# Patient Record
Sex: FEMALE | Race: WHITE | NOT HISPANIC OR LATINO | Employment: OTHER | URBAN - METROPOLITAN AREA
[De-identification: names, ages, dates, MRNs, and addresses within clinical notes are randomized per-mention and may not be internally consistent; named-entity substitution may affect disease eponyms.]

---

## 2021-03-17 ENCOUNTER — TRANSCRIBE ORDERS (OUTPATIENT)
Dept: PULMONOLOGY | Facility: HOSPITAL | Age: 66
End: 2021-03-17

## 2021-03-17 DIAGNOSIS — R93.89 ABNORMAL CHEST X-RAY: Primary | ICD-10-CM

## 2021-03-23 ENCOUNTER — HOSPITAL ENCOUNTER (OUTPATIENT)
Dept: CT IMAGING | Facility: HOSPITAL | Age: 66
Discharge: HOME | End: 2021-03-23
Admitting: INTERNAL MEDICINE

## 2021-03-23 DIAGNOSIS — R93.89 ABNORMAL CHEST X-RAY: ICD-10-CM

## 2021-03-23 PROCEDURE — 71250 CT THORAX DX C-: CPT

## 2022-01-28 ENCOUNTER — OFFICE VISIT (OUTPATIENT)
Dept: PSYCHIATRY | Facility: CLINIC | Age: 67
End: 2022-01-28

## 2022-01-28 VITALS — DIASTOLIC BLOOD PRESSURE: 89 MMHG | SYSTOLIC BLOOD PRESSURE: 162 MMHG | WEIGHT: 236.6 LBS | BODY MASS INDEX: 38.19 KG/M2

## 2022-01-28 DIAGNOSIS — F41.1 GAD (GENERALIZED ANXIETY DISORDER): Chronic | ICD-10-CM

## 2022-01-28 DIAGNOSIS — F51.05 INSOMNIA DUE TO OTHER MENTAL DISORDER: Chronic | ICD-10-CM

## 2022-01-28 DIAGNOSIS — F99 INSOMNIA DUE TO OTHER MENTAL DISORDER: Chronic | ICD-10-CM

## 2022-01-28 DIAGNOSIS — F33.2 SEVERE EPISODE OF RECURRENT MAJOR DEPRESSIVE DISORDER, WITHOUT PSYCHOTIC FEATURES: Primary | Chronic | ICD-10-CM

## 2022-01-28 PROCEDURE — 90792 PSYCH DIAG EVAL W/MED SRVCS: CPT

## 2022-01-28 RX ORDER — AZELASTINE HYDROCHLORIDE, FLUTICASONE PROPIONATE 137; 50 UG/1; UG/1
SPRAY, METERED NASAL
COMMUNITY
Start: 2022-01-27

## 2022-01-28 RX ORDER — FUROSEMIDE 20 MG/1
20 TABLET ORAL DAILY
COMMUNITY
Start: 2021-11-02

## 2022-01-28 RX ORDER — ROSUVASTATIN CALCIUM 10 MG/1
10 TABLET, COATED ORAL DAILY
COMMUNITY
Start: 2021-11-16

## 2022-01-28 RX ORDER — ALBUTEROL SULFATE 90 UG/1
AEROSOL, METERED RESPIRATORY (INHALATION)
COMMUNITY
Start: 2015-01-16

## 2022-01-28 RX ORDER — GUAIFENESIN 600 MG/1
TABLET, EXTENDED RELEASE ORAL
COMMUNITY
Start: 2022-01-28

## 2022-01-28 RX ORDER — VILAZODONE HYDROCHLORIDE 20 MG/1
20 TABLET ORAL DAILY
COMMUNITY
Start: 2022-01-14 | End: 2022-03-28

## 2022-01-28 RX ORDER — CONJUGATED ESTROGENS 0.62 MG/G
CREAM VAGINAL
COMMUNITY
Start: 2021-08-09 | End: 2022-03-28

## 2022-01-28 RX ORDER — CETIRIZINE HYDROCHLORIDE 10 MG/1
10 TABLET ORAL DAILY
COMMUNITY

## 2022-01-28 RX ORDER — LEVOTHYROXINE SODIUM 0.07 MG/1
75 TABLET ORAL DAILY
COMMUNITY
Start: 2021-12-31 | End: 2023-02-14

## 2022-01-28 RX ORDER — LEVOTHYROXINE SODIUM 0.05 MG/1
50 TABLET ORAL DAILY
COMMUNITY
Start: 2021-11-16 | End: 2022-03-28 | Stop reason: DRUGHIGH

## 2022-01-28 RX ORDER — TIZANIDINE 4 MG/1
4 TABLET ORAL
COMMUNITY
Start: 2022-01-14 | End: 2022-04-26

## 2022-01-28 RX ORDER — BUSPIRONE HYDROCHLORIDE 10 MG/1
10 TABLET ORAL 2 TIMES DAILY
Qty: 60 TABLET | Refills: 2 | Status: SHIPPED | OUTPATIENT
Start: 2022-01-28 | End: 2022-03-28 | Stop reason: SDUPTHER

## 2022-01-28 RX ORDER — MONTELUKAST SODIUM 10 MG/1
TABLET ORAL
COMMUNITY
Start: 2013-12-23

## 2022-01-28 RX ORDER — OLMESARTAN MEDOXOMIL 20 MG/1
20 TABLET ORAL DAILY
COMMUNITY
Start: 2021-12-23

## 2022-01-28 RX ORDER — MULTIPLE VITAMINS W/ MINERALS TAB 9MG-400MCG
TAB ORAL
COMMUNITY
Start: 2022-01-28 | End: 2022-03-28

## 2022-01-28 RX ORDER — TRAZODONE HYDROCHLORIDE 50 MG/1
TABLET ORAL
Qty: 30 TABLET | Refills: 0 | Status: SHIPPED | OUTPATIENT
Start: 2022-01-28 | End: 2022-02-25

## 2022-02-25 DIAGNOSIS — F51.05 INSOMNIA DUE TO OTHER MENTAL DISORDER: Chronic | ICD-10-CM

## 2022-02-25 DIAGNOSIS — F99 INSOMNIA DUE TO OTHER MENTAL DISORDER: Chronic | ICD-10-CM

## 2022-02-25 RX ORDER — TRAZODONE HYDROCHLORIDE 50 MG/1
TABLET ORAL
Qty: 30 TABLET | Refills: 0 | Status: SHIPPED | OUTPATIENT
Start: 2022-02-25 | End: 2022-03-24

## 2022-03-24 DIAGNOSIS — F99 INSOMNIA DUE TO OTHER MENTAL DISORDER: Chronic | ICD-10-CM

## 2022-03-24 DIAGNOSIS — F51.05 INSOMNIA DUE TO OTHER MENTAL DISORDER: Chronic | ICD-10-CM

## 2022-03-24 RX ORDER — TRAZODONE HYDROCHLORIDE 50 MG/1
TABLET ORAL
Qty: 30 TABLET | Refills: 0 | Status: SHIPPED | OUTPATIENT
Start: 2022-03-24 | End: 2022-03-28 | Stop reason: DRUGHIGH

## 2022-03-28 ENCOUNTER — OFFICE VISIT (OUTPATIENT)
Dept: PSYCHIATRY | Facility: CLINIC | Age: 67
End: 2022-03-28

## 2022-03-28 DIAGNOSIS — F41.1 GAD (GENERALIZED ANXIETY DISORDER): Primary | Chronic | ICD-10-CM

## 2022-03-28 DIAGNOSIS — F33.42 RECURRENT MAJOR DEPRESSIVE DISORDER, IN FULL REMISSION: Chronic | ICD-10-CM

## 2022-03-28 DIAGNOSIS — F99 INSOMNIA DUE TO OTHER MENTAL DISORDER: Chronic | ICD-10-CM

## 2022-03-28 DIAGNOSIS — F51.05 INSOMNIA DUE TO OTHER MENTAL DISORDER: Chronic | ICD-10-CM

## 2022-03-28 PROCEDURE — 99214 OFFICE O/P EST MOD 30 MIN: CPT

## 2022-03-28 RX ORDER — VILAZODONE HYDROCHLORIDE 40 MG/1
40 TABLET ORAL DAILY
Qty: 30 TABLET | Refills: 2 | Status: SHIPPED | OUTPATIENT
Start: 2022-03-28 | End: 2022-06-02 | Stop reason: SDUPTHER

## 2022-03-28 RX ORDER — TRAZODONE HYDROCHLORIDE 100 MG/1
100 TABLET ORAL NIGHTLY
Qty: 30 TABLET | Refills: 2 | Status: SHIPPED | OUTPATIENT
Start: 2022-03-28 | End: 2022-06-02 | Stop reason: SDUPTHER

## 2022-03-28 RX ORDER — BUSPIRONE HYDROCHLORIDE 10 MG/1
10 TABLET ORAL 2 TIMES DAILY
Qty: 60 TABLET | Refills: 2 | Status: SHIPPED | OUTPATIENT
Start: 2022-03-28 | End: 2022-06-02

## 2022-04-21 DIAGNOSIS — F51.05 INSOMNIA DUE TO OTHER MENTAL DISORDER: Chronic | ICD-10-CM

## 2022-04-21 DIAGNOSIS — F99 INSOMNIA DUE TO OTHER MENTAL DISORDER: Chronic | ICD-10-CM

## 2022-04-21 RX ORDER — TRAZODONE HYDROCHLORIDE 50 MG/1
TABLET ORAL
Qty: 30 TABLET | Refills: 0 | OUTPATIENT
Start: 2022-04-21

## 2022-06-02 ENCOUNTER — OFFICE VISIT (OUTPATIENT)
Dept: PSYCHIATRY | Facility: CLINIC | Age: 67
End: 2022-06-02

## 2022-06-02 VITALS
BODY MASS INDEX: 36.06 KG/M2 | SYSTOLIC BLOOD PRESSURE: 152 MMHG | DIASTOLIC BLOOD PRESSURE: 82 MMHG | OXYGEN SATURATION: 96 % | HEART RATE: 69 BPM | WEIGHT: 223.4 LBS

## 2022-06-02 DIAGNOSIS — F51.05 INSOMNIA DUE TO OTHER MENTAL DISORDER: Chronic | ICD-10-CM

## 2022-06-02 DIAGNOSIS — F33.42 RECURRENT MAJOR DEPRESSIVE DISORDER, IN FULL REMISSION: Chronic | ICD-10-CM

## 2022-06-02 DIAGNOSIS — F99 INSOMNIA DUE TO OTHER MENTAL DISORDER: Chronic | ICD-10-CM

## 2022-06-02 DIAGNOSIS — F41.1 GAD (GENERALIZED ANXIETY DISORDER): Primary | Chronic | ICD-10-CM

## 2022-06-02 PROCEDURE — 99214 OFFICE O/P EST MOD 30 MIN: CPT

## 2022-06-02 RX ORDER — TRAZODONE HYDROCHLORIDE 100 MG/1
100 TABLET ORAL NIGHTLY
Qty: 90 TABLET | Refills: 1 | Status: SHIPPED | OUTPATIENT
Start: 2022-06-02 | End: 2023-01-20 | Stop reason: SDUPTHER

## 2022-06-02 RX ORDER — VILAZODONE HYDROCHLORIDE 40 MG/1
40 TABLET ORAL DAILY
Qty: 90 TABLET | Refills: 1 | Status: SHIPPED | OUTPATIENT
Start: 2022-06-02 | End: 2022-12-05 | Stop reason: SDUPTHER

## 2022-06-02 RX ORDER — BUSPIRONE HYDROCHLORIDE 15 MG/1
15 TABLET ORAL 2 TIMES DAILY
Qty: 60 TABLET | Refills: 2 | Status: SHIPPED | OUTPATIENT
Start: 2022-06-02 | End: 2022-09-09

## 2022-06-03 ENCOUNTER — TELEPHONE (OUTPATIENT)
Dept: PSYCHIATRY | Facility: CLINIC | Age: 67
End: 2022-06-03

## 2022-06-03 NOTE — TELEPHONE ENCOUNTER
Patient called asking if there is any way that a letter can be written for her about her support animal. He is certified, a king stefany marcum. She going through a divorce and the  is trying to take the dog away from her.   Her  stated she needed a letter from her doctor to be able to keep the dog, that has been hers from the beginning.

## 2022-07-23 DIAGNOSIS — F51.05 INSOMNIA DUE TO OTHER MENTAL DISORDER: Chronic | ICD-10-CM

## 2022-07-23 DIAGNOSIS — F99 INSOMNIA DUE TO OTHER MENTAL DISORDER: Chronic | ICD-10-CM

## 2022-07-27 RX ORDER — TRAZODONE HYDROCHLORIDE 100 MG/1
100 TABLET ORAL NIGHTLY
Qty: 30 TABLET | OUTPATIENT
Start: 2022-07-27

## 2022-09-08 DIAGNOSIS — F41.1 GAD (GENERALIZED ANXIETY DISORDER): Chronic | ICD-10-CM

## 2022-09-09 RX ORDER — BUSPIRONE HYDROCHLORIDE 15 MG/1
TABLET ORAL
Qty: 180 TABLET | Refills: 1 | Status: SHIPPED | OUTPATIENT
Start: 2022-09-09 | End: 2023-02-14 | Stop reason: SDUPTHER

## 2022-12-05 DIAGNOSIS — F33.42 RECURRENT MAJOR DEPRESSIVE DISORDER, IN FULL REMISSION: Chronic | ICD-10-CM

## 2022-12-05 DIAGNOSIS — F41.1 GAD (GENERALIZED ANXIETY DISORDER): Chronic | ICD-10-CM

## 2022-12-05 NOTE — TELEPHONE ENCOUNTER
Rx Refill Note  Requested Prescriptions     Pending Prescriptions Disp Refills   • vilazodone (Viibryd) 40 MG tablet tablet 90 tablet 1     Sig: Take 1 tablet by mouth Daily.      Last office visit with prescribing clinician: Visit date not found     Next office visit with prescribing clinician: 11/3/22 gladys (chandrakant)    Office Visit with Chandrakant Colin PA-C (06/02/2022)    Maria C Jacobs  12/05/22, 15:10 EST     She is next in line to make an appt

## 2022-12-06 RX ORDER — VILAZODONE HYDROCHLORIDE 40 MG/1
40 TABLET ORAL DAILY
Qty: 90 TABLET | Refills: 0 | Status: SHIPPED | OUTPATIENT
Start: 2022-12-06 | End: 2023-02-14 | Stop reason: SDUPTHER

## 2023-01-20 DIAGNOSIS — F99 INSOMNIA DUE TO OTHER MENTAL DISORDER: Chronic | ICD-10-CM

## 2023-01-20 DIAGNOSIS — F51.05 INSOMNIA DUE TO OTHER MENTAL DISORDER: Chronic | ICD-10-CM

## 2023-01-20 RX ORDER — TRAZODONE HYDROCHLORIDE 100 MG/1
100 TABLET ORAL NIGHTLY
Qty: 90 TABLET | Refills: 0 | Status: SHIPPED | OUTPATIENT
Start: 2023-01-20 | End: 2023-02-14 | Stop reason: SDUPTHER

## 2023-01-20 NOTE — TELEPHONE ENCOUNTER
Rx Refill Note  Requested Prescriptions      No prescriptions requested or ordered in this encounter      Last office visit with prescribing clinician: Visit date not found   Next office visit with prescribing clinician: 2/14/2023     Progress Notes by Bull Colin PA-C (06/02/2022 15:00)    {  Mary Jo Tovar MA  01/20/23, 10:27 EST

## 2023-02-14 ENCOUNTER — OFFICE VISIT (OUTPATIENT)
Dept: PSYCHIATRY | Facility: CLINIC | Age: 68
End: 2023-02-14
Payer: MEDICARE

## 2023-02-14 VITALS
WEIGHT: 216.2 LBS | BODY MASS INDEX: 34.9 KG/M2 | HEART RATE: 73 BPM | DIASTOLIC BLOOD PRESSURE: 72 MMHG | SYSTOLIC BLOOD PRESSURE: 138 MMHG | OXYGEN SATURATION: 98 %

## 2023-02-14 DIAGNOSIS — F41.1 GAD (GENERALIZED ANXIETY DISORDER): Chronic | ICD-10-CM

## 2023-02-14 DIAGNOSIS — F33.1 MAJOR DEPRESSIVE DISORDER, RECURRENT EPISODE, MODERATE: Primary | Chronic | ICD-10-CM

## 2023-02-14 DIAGNOSIS — F51.05 INSOMNIA DUE TO OTHER MENTAL DISORDER: Chronic | ICD-10-CM

## 2023-02-14 DIAGNOSIS — F99 INSOMNIA DUE TO OTHER MENTAL DISORDER: Chronic | ICD-10-CM

## 2023-02-14 PROCEDURE — 99214 OFFICE O/P EST MOD 30 MIN: CPT

## 2023-02-14 RX ORDER — ALBUTEROL SULFATE 2.5 MG/3ML
SOLUTION RESPIRATORY (INHALATION)
COMMUNITY
Start: 2022-12-12

## 2023-02-14 RX ORDER — MONTELUKAST SODIUM 10 MG/1
1 TABLET ORAL NIGHTLY
COMMUNITY
Start: 2022-12-05

## 2023-02-14 RX ORDER — LEVOTHYROXINE SODIUM 0.07 MG/1
75 TABLET ORAL DAILY
COMMUNITY
Start: 2022-10-17 | End: 2023-11-29

## 2023-02-14 RX ORDER — ALBUTEROL SULFATE 2.5 MG/3ML
2.5 SOLUTION RESPIRATORY (INHALATION)
COMMUNITY
Start: 2022-12-11

## 2023-02-14 RX ORDER — BUSPIRONE HYDROCHLORIDE 15 MG/1
15 TABLET ORAL
COMMUNITY
Start: 2022-10-17

## 2023-02-14 RX ORDER — TRAZODONE HYDROCHLORIDE 100 MG/1
100 TABLET ORAL NIGHTLY
Qty: 90 TABLET | Refills: 1 | Status: SHIPPED | OUTPATIENT
Start: 2023-02-14

## 2023-02-14 RX ORDER — BUSPIRONE HYDROCHLORIDE 15 MG/1
15 TABLET ORAL 2 TIMES DAILY
Qty: 180 TABLET | Refills: 1 | Status: SHIPPED | OUTPATIENT
Start: 2023-02-14

## 2023-02-14 RX ORDER — VILAZODONE HYDROCHLORIDE 40 MG/1
40 TABLET ORAL DAILY
Qty: 90 TABLET | Refills: 1 | Status: SHIPPED | OUTPATIENT
Start: 2023-02-14

## 2023-02-14 RX ORDER — ALBUTEROL SULFATE 90 UG/1
AEROSOL, METERED RESPIRATORY (INHALATION)
COMMUNITY
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
## 2023-05-04 DIAGNOSIS — F41.1 GAD (GENERALIZED ANXIETY DISORDER): Chronic | ICD-10-CM

## 2023-05-04 DIAGNOSIS — F33.1 MAJOR DEPRESSIVE DISORDER, RECURRENT EPISODE, MODERATE: Chronic | ICD-10-CM

## 2023-05-04 RX ORDER — VILAZODONE HYDROCHLORIDE 40 MG/1
40 TABLET ORAL DAILY
Qty: 90 TABLET | Refills: 1 | Status: SHIPPED | OUTPATIENT
Start: 2023-05-04

## 2023-05-15 NOTE — PROGRESS NOTES
Subjective   Carlotta Cooper is a 68 y.o. female who presents today for follow-up for psychiatric medication management.     Chief Complaint:  Follow up for depression, anxiety, and insomnia.     History of Present Illness:     Medication adjustments last visit:  Continue vilazodone 40mg daily.   Continue trazodone 100mg daily.   Continue buspirone 15mg twice daily.      She feels like things are going well, all things considered   They have pushed off court to September now.   His daughter has cut her off and she is not able to see her grandkids (twins).   She feels like depression is ok. No SI/HI/AVH.  She is having some difficulty with sleep, but a lot of it is pain related. We will increase her trazodone to 150mg. Leave other medications the same for now.   Will plan to follow up in 4 months unless needed sooner.     Ability and capacity to respond to treatment: good  Functional status: fair  Prognosis: good  Long term goals: improve depression and overall quality of life.   Short term goals:patient would like to be able to function to get through her divorce when she thinks things will be better.   Strengths: She is compliant with medications.   Weaknesses: She is going through a divorce after a long marriage which makes things very difficult for her.     Patient presents with symptoms and behaviors that are consistent with the following DSM-5 diagnoses:  1. Major depressive disorder  2. Generalized anxiety disorder  3. Insomnia    The following portions of the patient's history were reviewed and updated as appropriate: allergies, current medications, past family history, past medical history, past social history, past surgical history and problem list.    PAST OUTPATIENT TREATMENT  Diagnosis treated:  Affective Disorder, Anxiety/Panic Disorder  Treatment Type:  Medication Management  Prior Psychiatric Medications:  Pristiq - inefficacy after months of use.  Viibryd-effective at 40 mg.  Trazodone-improved  insomnia.  Buspirone-improved anxiety.  Support Groups:  None  Sequelae Of Mental Disorder:  job disruption, family disruption, emotional distress    Interval History  Improved    Side Effects  None      Past Medical History:  Past Medical History:   Diagnosis Date   • Anxiety All my life   • Chronic pain disorder    • Depression Menopause - maybe 15 years ago   • Disease of thyroid gland    • Liver disease Fatty liver disease 2019   • Peripheral neuropathy 2017    Hurt back   • Severe episode of recurrent major depressive disorder, without psychotic features 2022       Social History:  Social History     Socioeconomic History   • Marital status:    Tobacco Use   • Smoking status: Former     Packs/day: 0.25     Years: 10.00     Pack years: 2.50     Types: Cigarettes     Start date: 9/10/1973     Quit date: 3/17/1983     Years since quittin.1   • Smokeless tobacco: Never   • Tobacco comments:     Tore me up   Vaping Use   • Vaping Use: Never used   Substance and Sexual Activity   • Alcohol use: Not Currently   • Drug use: Never   • Sexual activity: Not Currently     Partners: Male     Birth control/protection: None     Comment: Old age       Family History:  Family History   Problem Relation Age of Onset   • Anxiety disorder Mother    • Dementia Mother    • Depression Mother    • Anxiety disorder Sister        Past Surgical History:  Past Surgical History:   Procedure Laterality Date   • ABDOMINAL SURGERY      Gastric sleeve and hiatal hernia repair   • BACK SURGERY  2018   • CARDIAC CATHETERIZATION     • COLONOSCOPY  2 times   • ENDOSCOPY  Past? ,    • EYE SURGERY  Lens for vision and cataracts   • HERNIA REPAIR         Problem List:  Patient Active Problem List   Diagnosis   • Recurrent major depressive disorder, in full remission   • SYLVESTER (generalized anxiety disorder)   • Insomnia due to other mental disorder       Allergy:   Allergies   Allergen Reactions   •  Phentermine Other (See Comments)     tinglilng  tinglilng     • Vancomycin Rash, Hives and Itching     Itching not long after started.  Itching not long after started.     • Phentermine Hcl Unknown - Low Severity        Discontinued Medications:  Medications Discontinued During This Encounter   Medication Reason   • CRANBERRY ULTRA STRENGTH PO *Therapy completed   • guaiFENesin (Mucinex) 600 MG 12 hr tablet *Therapy completed   • D-Mannose (SV D-Mannose) 500 MG capsule *Therapy completed   • albuterol (PROVENTIL) (2.5 MG/3ML) 0.083% nebulizer solution *Error   • albuterol sulfate  (90 Base) MCG/ACT inhaler *Error   • busPIRone (BUSPAR) 15 MG tablet *Error   • Cetirizine HCl 10 MG capsule *Error   • montelukast (SINGULAIR) 10 MG tablet *Error   • traZODone (DESYREL) 100 MG tablet Reorder   • busPIRone (BUSPAR) 15 MG tablet Reorder   • vilazodone (VIIBRYD) 40 MG tablet tablet Reorder       Current Medications:   Current Outpatient Medications   Medication Sig Dispense Refill   • albuterol (PROVENTIL) (2.5 MG/3ML) 0.083% nebulizer solution USE 1 VIAL VIA NEBULIZER EVERY 4 HOURS AS NEEDED FOR WHEEZING OR SHORTNESS OF AIR     • albuterol sulfate  (90 Base) MCG/ACT inhaler Ventolin  (90 Base) MCG/ACT Inhalation Aerosol Solution; Patient Sig: Ventolin  (90 Base) MCG/ACT Inhalation Aerosol Solution ; 0; 16-Jan-2015; Active     • busPIRone (BUSPAR) 15 MG tablet Take 1 tablet by mouth 2 (Two) Times a Day. 180 tablet 1   • cetirizine (zyrTEC) 10 MG tablet Take 1 tablet by mouth Daily.     • furosemide (LASIX) 20 MG tablet Take 1 tablet by mouth Daily.     • levothyroxine (SYNTHROID, LEVOTHROID) 75 MCG tablet Take 1 tablet by mouth Daily.     • montelukast (SINGULAIR) 10 MG tablet Take 1 tablet by mouth Every Night.     • olmesartan (BENICAR) 20 MG tablet Take 1 tablet by mouth Daily.     • rosuvastatin (CRESTOR) 10 MG tablet Take 1 tablet by mouth Daily.     • traZODone (DESYREL) 150 MG tablet Take  1 tablet by mouth Every Night. 90 tablet 1   • Trelegy Ellipta 100-62.5-25 MCG/INH inhaler Inhale 1 puff Daily.     • vilazodone (VIIBRYD) 40 MG tablet tablet Take 1 tablet by mouth Daily. 90 tablet 1   • Azelastine-Fluticasone 137-50 MCG/ACT suspension  (Patient not taking: Reported on 5/16/2023)       No current facility-administered medications for this visit.         Psychological ROS: positive for - anxiety, depression and sleep disturbances  negative for - behavioral disorder, concentration difficulties, decreased libido, disorientation, hallucinations, hostility, irritability, memory difficulties, mood swings, obsessive thoughts, physical abuse, sexual abuse or suicidal ideation      Physical Exam:   Blood pressure 104/78, pulse 63, SpO2 99 %.    Mental Status Exam:   Hygiene:   good  Cooperation:  Cooperative  Eye Contact:  Good  Psychomotor Behavior:  Appropriate  Affect:  Appropriate  Mood: normal  Hopelessness: Denies  Speech:  Normal  Thought Process:  Goal directed and Linear  Thought Content:  Normal  Suicidal:  None  Homicidal:  None  Hallucinations:  None  Delusion:  None  Memory:  Intact  Orientation:  Person, Place, Time and Situation  Reliability:  good  Insight:  Good  Judgement:  Good  Impulse Control:  Good  Physical/Medical Issues:  Yes chronic pain     Mental status exam was reviewed and compared to visit on 2/14/23 and appropriate updates were made.     PHQ-9 Depression Screening    Little interest or pleasure in doing things? 0-->not at all   Feeling down, depressed, or hopeless? 1-->several days   Trouble falling or staying asleep, or sleeping too much? 1-->several days   Feeling tired or having little energy? 1-->several days   Poor appetite or overeating? 1-->several days   Feeling bad about yourself - or that you are a failure or have let yourself or your family down? 2-->more than half the days   Trouble concentrating on things, such as reading the newspaper or watching television?  2-->more than half the days   Moving or speaking so slowly that other people could have noticed? Or the opposite - being so fidgety or restless that you have been moving around a lot more than usual? 0-->not at all   Thoughts that you would be better off dead, or of hurting yourself in some way? 0-->not at all   PHQ-9 Total Score 8   If you checked off any problems, how difficult have these problems made it for you to do your work, take care of things at home, or get along with other people? somewhat difficult        Never smoker    I advised Carlotta of the risks of tobacco use.     Result Review:    Labs:  No visits with results within 3 Month(s) from this visit.   Latest known visit with results is:   No results found for any previous visit.       Assessment & Plan   Diagnoses and all orders for this visit:    1. Insomnia due to other mental disorder  -     traZODone (DESYREL) 150 MG tablet; Take 1 tablet by mouth Every Night.  Dispense: 90 tablet; Refill: 1    2. Major depressive disorder, recurrent episode, moderate  -     vilazodone (VIIBRYD) 40 MG tablet tablet; Take 1 tablet by mouth Daily.  Dispense: 90 tablet; Refill: 1    3. SYLVESTER (generalized anxiety disorder)  -     vilazodone (VIIBRYD) 40 MG tablet tablet; Take 1 tablet by mouth Daily.  Dispense: 90 tablet; Refill: 1  -     busPIRone (BUSPAR) 15 MG tablet; Take 1 tablet by mouth 2 (Two) Times a Day.  Dispense: 180 tablet; Refill: 1         Continue vilazodone 40mg daily.   Continue trazodone, increase to 150mg daily.   Continue buspirone 15mg twice daily.      Visit Diagnoses:    ICD-10-CM ICD-9-CM   1. Insomnia due to other mental disorder  F51.05 300.9    F99 327.02   2. Major depressive disorder, recurrent episode, moderate  F33.1 296.32   3. SYLVESTER (generalized anxiety disorder)  F41.1 300.02       TREATMENT PLAN/GOALS: Continue supportive psychotherapy efforts and medications as indicated. Treatment and medication options discussed during today's visit.  Patient ackowledged and verbally consented to continue with current treatment plan and was educated on the importance of compliance with treatment and follow-up appointments.    MEDICATION ISSUES:  INSPECT reviewed as expected    Discussed medication options and treatment plan of prescribed medication as well as the risks, benefits, and side effects including potential falls, possible impaired driving and metabolic adversities among others. Patient is agreeable to call the office with any worsening of symptoms or onset of side effects. Patient is agreeable to call 911 or go to the nearest ER should he/she begin having SI/HI. No medication side effects or related complaints today.     MEDS ORDERED DURING VISIT:  New Medications Ordered This Visit   Medications   • traZODone (DESYREL) 150 MG tablet     Sig: Take 1 tablet by mouth Every Night.     Dispense:  90 tablet     Refill:  1   • vilazodone (VIIBRYD) 40 MG tablet tablet     Sig: Take 1 tablet by mouth Daily.     Dispense:  90 tablet     Refill:  1   • busPIRone (BUSPAR) 15 MG tablet     Sig: Take 1 tablet by mouth 2 (Two) Times a Day.     Dispense:  180 tablet     Refill:  1       Return in about 4 months (around 9/16/2023).         This document has been electronically signed by JONATHAN Valadez  May 16, 2023 10:38 EDT    Part of this note may be an electronic transcription/translation of spoken language to printed text using the Dragon Dictation System.    Answers for HPI/ROS submitted by the patient on 2/7/2023  Please describe your symptoms.: Depression and anxiety ongoing going through nasty divorce  Have you had these symptoms before?: Yes  How long have you been having these symptoms?: Greater than 2 weeks  Please list any medications you are currently taking for this condition.: BusPIRone 15 mg twice a day, Vilazodone 40, [id number]  Please describe any probable cause for these symptoms. : Going through a divorce. I filed over a year ago  What  is the primary reason for your visit?: Other

## 2023-05-16 ENCOUNTER — OFFICE VISIT (OUTPATIENT)
Dept: PSYCHIATRY | Facility: CLINIC | Age: 68
End: 2023-05-16
Payer: MEDICARE

## 2023-05-16 VITALS — DIASTOLIC BLOOD PRESSURE: 78 MMHG | HEART RATE: 63 BPM | OXYGEN SATURATION: 99 % | SYSTOLIC BLOOD PRESSURE: 104 MMHG

## 2023-05-16 DIAGNOSIS — F51.05 INSOMNIA DUE TO OTHER MENTAL DISORDER: Chronic | ICD-10-CM

## 2023-05-16 DIAGNOSIS — F99 INSOMNIA DUE TO OTHER MENTAL DISORDER: Chronic | ICD-10-CM

## 2023-05-16 DIAGNOSIS — F33.1 MAJOR DEPRESSIVE DISORDER, RECURRENT EPISODE, MODERATE: Chronic | ICD-10-CM

## 2023-05-16 DIAGNOSIS — F41.1 GAD (GENERALIZED ANXIETY DISORDER): Chronic | ICD-10-CM

## 2023-05-16 RX ORDER — TRAZODONE HYDROCHLORIDE 150 MG/1
150 TABLET ORAL NIGHTLY
Qty: 90 TABLET | Refills: 1 | Status: SHIPPED | OUTPATIENT
Start: 2023-05-16

## 2023-05-16 RX ORDER — VILAZODONE HYDROCHLORIDE 40 MG/1
40 TABLET ORAL DAILY
Qty: 90 TABLET | Refills: 1 | Status: SHIPPED | OUTPATIENT
Start: 2023-05-16

## 2023-05-16 RX ORDER — BUSPIRONE HYDROCHLORIDE 15 MG/1
15 TABLET ORAL 2 TIMES DAILY
Qty: 180 TABLET | Refills: 1 | Status: SHIPPED | OUTPATIENT
Start: 2023-05-16

## 2023-05-16 NOTE — PATIENT INSTRUCTIONS
Please return to clinic at your next scheduled visit.    We recommend you arrive 15 minutes prior to your appointment time to complete check in and insurance verification. Follow-up appointments for medication management are generally 15 minutes long. If you arrive more than 5 minutes late for your appointment, you may be asked to reschedule. This is to keep the clinic running smoothly and on-time, and to respect the appointment times that are given to all patients.   Contact the clinic ([phone]) at least 24 hours prior in the event you need to cancel or reschedule.    Note: Failure to give at least a 24 hour notice can result in being marked a same day cancel or a no show for your appointment. Per office policy, after 3 no shows, or 3 same day cancels in a 1 year period, you may be dismissed from the clinic.     Take all medications as prescribed.      If you are in need of medication refills, please call the clinic at [phone], 3-5 days before you are to run out of medication.   Medication refills requested on Friday may not be filled until the following week.     Should you need to get in touch with your provider, JONATHAN Santiago, contact the office ([phone]), prior to 4pm and Maria C, her medical assistant will be able to send her a message.   If your call is sent to [facility], leave a message and Maria C will call you back within 24 hours.     If you have an emergency after 4pm or on weekends, you can contact the provider on call by calling [phone]. You will receive the answering service, leave a message, and the provider on call will be contacted.      If you are feeling like you are suicidal or want to harm yourself or someone else, please call 911, or present to any local emergency room to be evaluated.   [facility] in [place], as well as [facility] in [place], each offer emergency psychiatric evaluation.     In the event you have personal  crisis, there are several resources to reach someone to talk with.   988 Suicide and Crisis Lifeline    Call or text 988 or chat 988the grafterline.org  [facility]'s National Helpline    7-593-295-HELP (6939)    Text your zip code to 576737 (HELP4U)  [place]'s Crisis Line    Dial 988, then press 1    Text 486269    You will NOT be able to contact provider on [facility], as Behavioral Health Providers are restricted. YOU MUST CALL.

## 2023-07-27 ENCOUNTER — TELEPHONE (OUTPATIENT)
Dept: PSYCHIATRY | Facility: CLINIC | Age: 68
End: 2023-07-27
Payer: MEDICARE
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
## 2023-09-12 NOTE — PROGRESS NOTES
Subjective   Carlotta Cooper is a 68 y.o. female who presents today for follow-up for psychiatric medication management.     Chief Complaint:  Follow up for depression, anxiety, and insomnia.     History of Present Illness:     Medication adjustments last visit:  Continue vilazodone 40mg daily.   Continue trazodone, increase to 150mg daily.   Continue buspirone 15mg twice daily.      She is taking 100mg of trazodone to sleep. She sometimes still has difficulty getting out of bed onn 100mg. We discussed decreasing to 50-100mg. She can take the second tablet if she needs it.   Mood has been ok. She feels like she is doing a lot better than she was.   She has had 4 episodes of remediation for her divorce and there is divorce court on Wednesday.    He has subpoenaed both adult children.   She gets nervous in remediation.   She feels like she will start feeling better once the divorce is settled.   She still isn't getting to see her twin grandkids.   Denies any suicidal ideation. Denies HI/AVH.     Ability and capacity to respond to treatment: good  Functional status: fair  Prognosis: good  Long term goals:  improve depression and overall quality of life.   Short term goals: patient would like to be able to function to get through her divorce when she thinks things will be better.   Strengths: She is compliant with medications.   Weaknesses: She is going through a divorce after a long marriage which makes things very difficult for her.     Patient presents with symptoms and behaviors that are consistent with the following DSM-5 diagnoses:  1. Major depressive disorder  2. Generalized anxiety disorder  3. Insomnia    The following portions of the patient's history were reviewed and updated as appropriate: allergies, current medications, past family history, past medical history, past social history, past surgical history and problem list.    PAST OUTPATIENT TREATMENT  Diagnosis treated:  Affective Disorder, Anxiety/Panic  Disorder  Treatment Type:  Medication Management  Prior Psychiatric Medications:  Pristiq - inefficacy after months of use.  Viibryd-effective at 40 mg.  Trazodone-improved insomnia.  Buspirone-improved anxiety.  Support Groups:  None  Sequelae Of Mental Disorder:  job disruption, family disruption, emotional distress    Interval History  Improved    Side Effects  None      Past Medical History:  Past Medical History:   Diagnosis Date    Anxiety All my life    Chronic pain disorder     Depression Menopause - maybe 15 years ago    Disease of thyroid gland     Liver disease Fatty liver disease 2019    Peripheral neuropathy 2017    Hurt back    Severe episode of recurrent major depressive disorder, without psychotic features 2022       Social History:  Social History     Socioeconomic History    Marital status:    Tobacco Use    Smoking status: Former     Packs/day: 0.25     Years: 10.00     Pack years: 2.50     Types: Cigarettes     Start date: 9/10/1973     Quit date: 3/17/1983     Years since quittin.5    Smokeless tobacco: Never    Tobacco comments:     Tore me up   Vaping Use    Vaping Use: Never used   Substance and Sexual Activity    Alcohol use: Not Currently    Drug use: Never    Sexual activity: Not Currently     Partners: Male     Birth control/protection: None     Comment: Old age       Family History:  Family History   Problem Relation Age of Onset    Anxiety disorder Mother     Dementia Mother     Depression Mother     Anxiety disorder Sister        Past Surgical History:  Past Surgical History:   Procedure Laterality Date    ABDOMINAL SURGERY      Gastric sleeve and hiatal hernia repair    BACK SURGERY  2018    CARDIAC CATHETERIZATION      COLONOSCOPY  2 times    ENDOSCOPY  Past? ,     EYE SURGERY  Lens for vision and cataracts    HERNIA REPAIR         Problem List:  Patient Active Problem List   Diagnosis    Recurrent major depressive disorder, in  full remission    SYLVESTER (generalized anxiety disorder)    Insomnia due to other mental disorder       Allergy:   Allergies   Allergen Reactions    Phentermine Other (See Comments)     tinglilng  tinglilng      Vancomycin Rash, Hives and Itching     Itching not long after started.  Itching not long after started.      Phentermine Hcl Unknown - Low Severity        Discontinued Medications:  Medications Discontinued During This Encounter   Medication Reason    olmesartan (BENICAR) 20 MG tablet     traZODone (DESYREL) 150 MG tablet          Current Medications:   Current Outpatient Medications   Medication Sig Dispense Refill    albuterol (PROVENTIL) (2.5 MG/3ML) 0.083% nebulizer solution USE 1 VIAL VIA NEBULIZER EVERY 4 HOURS AS NEEDED FOR WHEEZING OR SHORTNESS OF AIR      albuterol sulfate  (90 Base) MCG/ACT inhaler Ventolin  (90 Base) MCG/ACT Inhalation Aerosol Solution; Patient Sig: Ventolin  (90 Base) MCG/ACT Inhalation Aerosol Solution ; 0; 16-Jan-2015; Active      Azelastine-Fluticasone 137-50 MCG/ACT suspension  (Patient not taking: Reported on 5/16/2023)      busPIRone (BUSPAR) 15 MG tablet Take 1 tablet by mouth 2 (Two) Times a Day. 180 tablet 1    cetirizine (zyrTEC) 10 MG tablet Take 1 tablet by mouth Daily.      Farxiga 10 MG tablet Take 10 mg by mouth Daily.      furosemide (LASIX) 20 MG tablet Take 1 tablet by mouth Daily.      levothyroxine (SYNTHROID, LEVOTHROID) 75 MCG tablet Take 1 tablet by mouth Daily.      losartan (COZAAR) 25 MG tablet Take 1 tablet by mouth Daily.      montelukast (SINGULAIR) 10 MG tablet Take 1 tablet by mouth Every Night.      rosuvastatin (CRESTOR) 10 MG tablet Take 1 tablet by mouth Daily.      Trelegy Ellipta 100-62.5-25 MCG/INH inhaler Inhale 1 puff Daily.      vilazodone (VIIBRYD) 40 MG tablet tablet Take 1 tablet by mouth Daily. 90 tablet 1     No current facility-administered medications for this visit.         Psychological ROS: positive for -  anxiety, depression and sleep disturbances  negative for - behavioral disorder, concentration difficulties, decreased libido, disorientation, hallucinations, hostility, irritability, memory difficulties, mood swings, obsessive thoughts, physical abuse, sexual abuse or suicidal ideation      Physical Exam:   There were no vitals taken for this visit.    Mental Status Exam:   Hygiene:   good  Cooperation:  Cooperative  Eye Contact:  Good  Psychomotor Behavior:  Appropriate  Affect:  Appropriate  Mood: normal  Hopelessness: Denies  Speech:  Normal  Thought Process:  Goal directed and Linear  Thought Content:  Normal  Suicidal:  None  Homicidal:  None  Hallucinations:  None  Delusion:  None  Memory:  Intact  Orientation:  Person, Place, Time and Situation  Reliability:  good  Insight:  Good  Judgement:  Good  Impulse Control:  Good  Physical/Medical Issues:  Yes chronic pain      Mental status exam was reviewed and compared to visit on 5/16/23 and appropriate updates were made.     PHQ-9 Depression Screening    Little interest or pleasure in doing things? 0-->not at all   Feeling down, depressed, or hopeless? 1-->several days   Trouble falling or staying asleep, or sleeping too much? 1-->several days   Feeling tired or having little energy? 1-->several days   Poor appetite or overeating? 1-->several days   Feeling bad about yourself - or that you are a failure or have let yourself or your family down? 1-->several days   Trouble concentrating on things, such as reading the newspaper or watching television? 1-->several days   Moving or speaking so slowly that other people could have noticed? Or the opposite - being so fidgety or restless that you have been moving around a lot more than usual? 0-->not at all   Thoughts that you would be better off dead, or of hurting yourself in some way? 0-->not at all   PHQ-9 Total Score 6   If you checked off any problems, how difficult have these problems made it for you to do your  work, take care of things at home, or get along with other people? not difficult at all        Never smoker    I advised Carlotta of the risks of tobacco use.     Result Review:    Labs:  No visits with results within 3 Month(s) from this visit.   Latest known visit with results is:   No results found for any previous visit.       Assessment & Plan   Diagnoses and all orders for this visit:    1. Major depressive disorder, recurrent episode, moderate (Primary)    2. SYLVESTER (generalized anxiety disorder)    3. Insomnia due to other mental disorder           Continue vilazodone 40mg daily.   Continue trazodone, decrease to 50-00mg nightly.   Continue buspirone 15mg twice daily.      Visit Diagnoses:    ICD-10-CM ICD-9-CM   1. Major depressive disorder, recurrent episode, moderate  F33.1 296.32   2. SYLVESTER (generalized anxiety disorder)  F41.1 300.02   3. Insomnia due to other mental disorder  F51.05 300.9    F99 327.02         TREATMENT PLAN/GOALS: Continue supportive psychotherapy efforts and medications as indicated. Treatment and medication options discussed during today's visit. Patient ackowledged and verbally consented to continue with current treatment plan and was educated on the importance of compliance with treatment and follow-up appointments.    MEDICATION ISSUES:  INSPECT reviewed as expected    Discussed medication options and treatment plan of prescribed medication as well as the risks, benefits, and side effects including potential falls, possible impaired driving and metabolic adversities among others. Patient is agreeable to call the office with any worsening of symptoms or onset of side effects. Patient is agreeable to call 911 or go to the nearest ER should he/she begin having SI/HI. No medication side effects or related complaints today.     MEDS ORDERED DURING VISIT:  No orders of the defined types were placed in this encounter.      Return in about 8 weeks (around 11/9/2023).         This document has  been electronically signed by JONATHAN Valadez  September 14, 2023 13:29 EDT    Part of this note may be an electronic transcription/translation of spoken language to printed text using the Dragon Dictation System.    Answers for HPI/ROS submitted by the patient on 2/7/2023  Please describe your symptoms.: Depression and anxiety ongoing going through nasty divorce  Have you had these symptoms before?: Yes  How long have you been having these symptoms?: Greater than 2 weeks  Please list any medications you are currently taking for this condition.: BusPIRone 15 mg twice a day, Vilazodone 40, [id number]  Please describe any probable cause for these symptoms. : Going through a divorce. I filed over a year ago  What is the primary reason for your visit?: Other

## 2023-09-14 ENCOUNTER — OFFICE VISIT (OUTPATIENT)
Dept: PSYCHIATRY | Facility: CLINIC | Age: 68
End: 2023-09-14
Payer: MEDICARE

## 2023-09-14 DIAGNOSIS — F41.1 GAD (GENERALIZED ANXIETY DISORDER): Chronic | ICD-10-CM

## 2023-09-14 DIAGNOSIS — F99 INSOMNIA DUE TO OTHER MENTAL DISORDER: Chronic | ICD-10-CM

## 2023-09-14 DIAGNOSIS — F51.05 INSOMNIA DUE TO OTHER MENTAL DISORDER: Chronic | ICD-10-CM

## 2023-09-14 DIAGNOSIS — F33.1 MAJOR DEPRESSIVE DISORDER, RECURRENT EPISODE, MODERATE: Primary | Chronic | ICD-10-CM

## 2023-09-14 RX ORDER — DAPAGLIFLOZIN 10 MG/1
10 TABLET, FILM COATED ORAL DAILY
COMMUNITY

## 2023-09-14 RX ORDER — TRAZODONE HYDROCHLORIDE 50 MG/1
TABLET ORAL
Qty: 60 TABLET | Refills: 2 | Status: SHIPPED | OUTPATIENT
Start: 2023-09-14

## 2023-09-14 RX ORDER — LOSARTAN POTASSIUM 25 MG/1
25 TABLET ORAL DAILY
COMMUNITY

## 2023-09-14 RX ORDER — VILAZODONE HYDROCHLORIDE 40 MG/1
40 TABLET ORAL DAILY
Qty: 90 TABLET | Refills: 1 | Status: SHIPPED | OUTPATIENT
Start: 2023-09-14

## 2023-09-14 RX ORDER — BUSPIRONE HYDROCHLORIDE 15 MG/1
15 TABLET ORAL 2 TIMES DAILY
Qty: 180 TABLET | Refills: 1 | Status: SHIPPED | OUTPATIENT
Start: 2023-09-14

## 2023-09-22 ENCOUNTER — TRANSCRIBE ORDERS (OUTPATIENT)
Dept: DIABETES SERVICES | Facility: HOSPITAL | Age: 68
End: 2023-09-22
Payer: MEDICARE

## 2023-09-22 DIAGNOSIS — E11.22 TYPE 2 DIABETES MELLITUS WITH DIABETIC CHRONIC KIDNEY DISEASE, UNSPECIFIED CKD STAGE, UNSPECIFIED WHETHER LONG TERM INSULIN USE: ICD-10-CM

## 2023-09-22 DIAGNOSIS — N18.30 STAGE 3 CHRONIC KIDNEY DISEASE, UNSPECIFIED WHETHER STAGE 3A OR 3B CKD: Primary | ICD-10-CM

## 2023-09-25 ENCOUNTER — HOSPITAL ENCOUNTER (OUTPATIENT)
Dept: DIABETES SERVICES | Facility: HOSPITAL | Age: 68
Discharge: HOME | End: 2023-09-25
Admitting: INTERNAL MEDICINE
Payer: MEDICARE

## 2023-09-25 PROCEDURE — 97802 MEDICAL NUTRITION INDIV IN: CPT

## 2023-09-25 NOTE — CONSULTS
Graciela was seen today by Registered Dietitian for CKD Education. Consistent with the ADA’s standards of care, a comprehensive assessment/training record has been sent to medical records (see media tab).    Reports life has been stressful lately which is making it hard for her to stay on track. Brought in multiple protein powders/dietary supplements- discussed regulations of supplements. Encouraged making informed decisions and simplifying routine regarding use. CKD nutrient overview, reference daily intakes, and labels reviewed. History of bariatric surgery- discussed consistent carbohydrate pattern.    Graciela has been encouraged to call our office with questions or additional education needs. Please place referral for additional services or follow-up should need arise.    Thank you for the referral.

## 2023-10-13 DIAGNOSIS — F99 INSOMNIA DUE TO OTHER MENTAL DISORDER: Chronic | ICD-10-CM

## 2023-10-13 DIAGNOSIS — F51.05 INSOMNIA DUE TO OTHER MENTAL DISORDER: Chronic | ICD-10-CM

## 2023-10-13 RX ORDER — TRAZODONE HYDROCHLORIDE 50 MG/1
TABLET ORAL
Qty: 60 TABLET | Refills: 2 | Status: SHIPPED | OUTPATIENT
Start: 2023-10-13

## 2023-11-13 DIAGNOSIS — F41.1 GAD (GENERALIZED ANXIETY DISORDER): Chronic | ICD-10-CM

## 2023-11-13 RX ORDER — BUSPIRONE HYDROCHLORIDE 15 MG/1
15 TABLET ORAL 2 TIMES DAILY
Qty: 180 TABLET | Refills: 1 | Status: SHIPPED | OUTPATIENT
Start: 2023-11-13

## 2023-11-13 NOTE — PROGRESS NOTES
Subjective   Carlotta Cooper is a 68 y.o. female who presents today for follow-up for psychiatric medication management.     Chief Complaint:  Follow up for depression, anxiety, and insomnia.     History of Present Illness:     Medication adjustments last visit:  Continue vilazodone 40mg daily.   Continue trazodone, decrease to 50-00mg nightly.   Continue buspirone 15mg twice daily.      She has another meeting for her divorce coming up. The last one got extended again. This is very frustrating to her as it has been almost 2 years.   She feels like medications are working ok. She feels like she has some depression, but is doing ok.   She is taking just 50mg trazodone and it is working well for sleep. The 100mg was too much.   She is currently in the donut hole with her insurance so everything is more expensive. This has also been frustrating.   She denies any suicidal thoughts.   Overall feels like medications are working well and she does not need refills.     Patient presents with symptoms and behaviors that are consistent with the following DSM-5 diagnoses:  1. Major depressive disorder  2. Generalized anxiety disorder  3. Insomnia    The following portions of the patient's history were reviewed and updated as appropriate: allergies, current medications, past family history, past medical history, past social history, past surgical history and problem list.    PAST OUTPATIENT TREATMENT  Diagnosis treated:  Affective Disorder, Anxiety/Panic Disorder  Treatment Type:  Medication Management  Prior Psychiatric Medications:  Pristiq - inefficacy after months of use.  Viibryd-effective at 40 mg.  Trazodone-improved insomnia.  Buspirone-improved anxiety.  Support Groups:  None  Sequelae Of Mental Disorder:  job disruption, family disruption, emotional distress    Interval History  Improved    Side Effects  None      Past Medical History:  Past Medical History:   Diagnosis Date    Anxiety All my life    Chronic pain  disorder     Depression Menopause - maybe 15 years ago    Disease of thyroid gland     Liver disease Fatty liver disease 2019    Peripheral neuropathy 2017    Hurt back    Severe episode of recurrent major depressive disorder, without psychotic features 2022       Social History:  Social History     Socioeconomic History    Marital status:    Tobacco Use    Smoking status: Former     Packs/day: 0.25     Years: 10.00     Additional pack years: 0.00     Total pack years: 2.50     Types: Cigarettes     Start date: 9/10/1973     Quit date: 3/17/1983     Years since quittin.6    Smokeless tobacco: Never    Tobacco comments:     Tore me up   Vaping Use    Vaping Use: Never used   Substance and Sexual Activity    Alcohol use: Not Currently    Drug use: Never    Sexual activity: Not Currently     Partners: Male     Birth control/protection: None     Comment: Old age       Family History:  Family History   Problem Relation Age of Onset    Anxiety disorder Mother     Dementia Mother     Depression Mother     Anxiety disorder Sister        Past Surgical History:  Past Surgical History:   Procedure Laterality Date    ABDOMINAL SURGERY      Gastric sleeve and hiatal hernia repair    BACK SURGERY  2018    CARDIAC CATHETERIZATION      COLONOSCOPY  2 times    ENDOSCOPY  Past? ,     EYE SURGERY  Lens for vision and cataracts    HERNIA REPAIR  2010       Problem List:  Patient Active Problem List   Diagnosis    Recurrent major depressive disorder, in full remission    SYLVESTER (generalized anxiety disorder)    Insomnia due to other mental disorder       Allergy:   Allergies   Allergen Reactions    Phentermine Other (See Comments)     tinglilng  tinglilng      Vancomycin Rash, Hives and Itching     Itching not long after started.  Itching not long after started.      Phentermine Hcl Unknown - Low Severity        Discontinued Medications:  There are no discontinued  medications.        Current Medications:   Current Outpatient Medications   Medication Sig Dispense Refill    albuterol (PROVENTIL) (2.5 MG/3ML) 0.083% nebulizer solution USE 1 VIAL VIA NEBULIZER EVERY 4 HOURS AS NEEDED FOR WHEEZING OR SHORTNESS OF AIR      albuterol sulfate  (90 Base) MCG/ACT inhaler Ventolin  (90 Base) MCG/ACT Inhalation Aerosol Solution; Patient Sig: Ventolin  (90 Base) MCG/ACT Inhalation Aerosol Solution ; 0; 16-Jan-2015; Active      Azelastine-Fluticasone 137-50 MCG/ACT suspension  (Patient not taking: Reported on 5/16/2023)      busPIRone (BUSPAR) 15 MG tablet TAKE 1 TABLET BY MOUTH TWICE DAILY 180 tablet 1    cetirizine (zyrTEC) 10 MG tablet Take 1 tablet by mouth Daily.      Farxiga 10 MG tablet Take 10 mg by mouth Daily.      furosemide (LASIX) 20 MG tablet Take 1 tablet by mouth Daily.      levothyroxine (SYNTHROID, LEVOTHROID) 75 MCG tablet Take 1 tablet by mouth Daily.      losartan (COZAAR) 25 MG tablet Take 1 tablet by mouth Daily.      montelukast (SINGULAIR) 10 MG tablet Take 1 tablet by mouth Every Night.      rosuvastatin (CRESTOR) 10 MG tablet Take 1 tablet by mouth Daily.      traZODone (DESYREL) 50 MG tablet Take 1-2 tablets nightly as needed for sleep. 60 tablet 2    traZODone (DESYREL) 50 MG tablet Take 1-2 tablets nightly as needed for sleep. 60 tablet 2    Trelegy Ellipta 100-62.5-25 MCG/INH inhaler Inhale 1 puff Daily.      vilazodone (VIIBRYD) 40 MG tablet tablet Take 1 tablet by mouth Daily. 90 tablet 1     No current facility-administered medications for this visit.         Psychological ROS: positive for - anxiety, depression and sleep disturbances  negative for - behavioral disorder, concentration difficulties, decreased libido, disorientation, hallucinations, hostility, irritability, memory difficulties, mood swings, obsessive thoughts, physical abuse, sexual abuse or suicidal ideation      Physical Exam:   There were no vitals taken for this  visit.    Mental Status Exam:   Hygiene:   good  Cooperation:  Cooperative  Eye Contact:  Good  Psychomotor Behavior:  Appropriate  Affect:  Appropriate  Mood: normal  Hopelessness: Denies  Speech:  Normal  Thought Process:  Goal directed and Linear  Thought Content:  Normal  Suicidal:  None  Homicidal:  None  Hallucinations:  None  Delusion:  None  Memory:  Intact  Orientation:  Person, Place, Time and Situation  Reliability:  good  Insight:  Good  Judgement:  Good  Impulse Control:  Good  Physical/Medical Issues:  Yes chronic pain      Mental status exam was reviewed and compared to visit on 9/14/23 and appropriate updates were made.     PHQ-9 Depression Screening    Little interest or pleasure in doing things? 0-->not at all   Feeling down, depressed, or hopeless? 1-->several days   Trouble falling or staying asleep, or sleeping too much? 0-->not at all   Feeling tired or having little energy? 0-->not at all   Poor appetite or overeating? 2-->more than half the days   Feeling bad about yourself - or that you are a failure or have let yourself or your family down? 1-->several days   Trouble concentrating on things, such as reading the newspaper or watching television? 1-->several days   Moving or speaking so slowly that other people could have noticed? Or the opposite - being so fidgety or restless that you have been moving around a lot more than usual? 0-->not at all   Thoughts that you would be better off dead, or of hurting yourself in some way? 0-->not at all   PHQ-9 Total Score 5   If you checked off any problems, how difficult have these problems made it for you to do your work, take care of things at home, or get along with other people? not difficult at all        Never smoker    I advised Carlotta of the risks of tobacco use.     Result Review:    Labs:  No visits with results within 3 Month(s) from this visit.   Latest known visit with results is:   No results found for any previous visit.       Assessment &  Plan   Diagnoses and all orders for this visit:    1. Major depressive disorder, recurrent episode, moderate (Primary)    2. SYLVESTER (generalized anxiety disorder)    3. Insomnia due to other mental disorder        Continue vilazodone 40mg daily.   Continue trazodone, 50mg nightly.   Continue buspirone 15mg twice daily.      No refills needed today.     Visit Diagnoses:    ICD-10-CM ICD-9-CM   1. Major depressive disorder, recurrent episode, moderate  F33.1 296.32   2. SYLVESTER (generalized anxiety disorder)  F41.1 300.02   3. Insomnia due to other mental disorder  F51.05 300.9    F99 327.02           TREATMENT PLAN/GOALS: Continue supportive psychotherapy efforts and medications as indicated. Treatment and medication options discussed during today's visit. Patient ackowledged and verbally consented to continue with current treatment plan and was educated on the importance of compliance with treatment and follow-up appointments.    MEDICATION ISSUES:  INSPECT reviewed as expected    Discussed medication options and treatment plan of prescribed medication as well as the risks, benefits, and side effects including potential falls, possible impaired driving and metabolic adversities among others. Patient is agreeable to call the office with any worsening of symptoms or onset of side effects. Patient is agreeable to call 911 or go to the nearest ER should he/she begin having SI/HI. No medication side effects or related complaints today.     MEDS ORDERED DURING VISIT:  No orders of the defined types were placed in this encounter.      Return in about 3 months (around 2/14/2024).         This document has been electronically signed by JONATHAN Valadez  November 14, 2023 13:59 EST    Part of this note may be an electronic transcription/translation of spoken language to printed text using the Dragon Dictation System.    Answers for HPI/ROS submitted by the patient on 2/7/2023  Please describe your symptoms.: Depression and  anxiety ongoing going through nasty divorce  Have you had these symptoms before?: Yes  How long have you been having these symptoms?: Greater than 2 weeks  Please list any medications you are currently taking for this condition.: BusPIRone 15 mg twice a day, Vilazodone 40, [id number]  Please describe any probable cause for these symptoms. : Going through a divorce. I filed over a year ago  What is the primary reason for your visit?: Other

## 2023-11-14 ENCOUNTER — OFFICE VISIT (OUTPATIENT)
Dept: PSYCHIATRY | Facility: CLINIC | Age: 68
End: 2023-11-14
Payer: MEDICARE

## 2023-11-14 DIAGNOSIS — F99 INSOMNIA DUE TO OTHER MENTAL DISORDER: Chronic | ICD-10-CM

## 2023-11-14 DIAGNOSIS — F41.1 GAD (GENERALIZED ANXIETY DISORDER): Chronic | ICD-10-CM

## 2023-11-14 DIAGNOSIS — F51.05 INSOMNIA DUE TO OTHER MENTAL DISORDER: Chronic | ICD-10-CM

## 2023-11-14 DIAGNOSIS — F33.1 MAJOR DEPRESSIVE DISORDER, RECURRENT EPISODE, MODERATE: Primary | Chronic | ICD-10-CM

## 2024-04-29 NOTE — PROGRESS NOTES
Subjective   Carlotta Cooper is a 69 y.o. female who presents today for follow-up for psychiatric medication management.     Chief Complaint:  Follow up for depression, anxiety, and insomnia.     History of Present Illness:     Medication adjustments last visit:  Continue vilazodone 40mg daily.   Continue trazodone, 50mg nightly.   Continue buspirone 15mg twice daily.      Patient states she is doing well. She is still in remediation with her divorce. They are trying to wrap it up, but her exhusband keeps appealing it. She is doing better overall with this situation. She is more at peace, and states she isn't living in fear. She is just waiting for it to settle. Still some sadness that she doesn't get to see her twin grandkids due to her daughter still being upset.   Overall, she feels like medications are working well. She is much less depressed and less anxious.   Denies any suicidal thoughts.    She has several different mg options of the trazodone at home. She is going to try taking just 50mg or 75mg and will call back and let me know how it is working and what dose she would like me to send in.     Patient presents with symptoms and behaviors that are consistent with the following DSM-5 diagnoses:  1. Major depressive disorder  2. Generalized anxiety disorder  3. Insomnia    The following portions of the patient's history were reviewed and updated as appropriate: allergies, current medications, past family history, past medical history, past social history, past surgical history and problem list.    PAST OUTPATIENT TREATMENT  Diagnosis treated:  Affective Disorder, Anxiety/Panic Disorder  Treatment Type:  Medication Management  Prior Psychiatric Medications:  Pristiq - inefficacy after months of use.  Viibryd-effective at 40 mg.  Trazodone-improved insomnia.  Buspirone-improved anxiety.  Support Groups:  None  Sequelae Of Mental Disorder:  job disruption, family disruption, emotional distress    Interval  History  Improved    Side Effects  None      Past Medical History:  Past Medical History:   Diagnosis Date    Anxiety All my life    Chronic pain disorder     Depression Menopause - maybe 15 years ago    Disease of thyroid gland     Liver disease Fatty liver disease 2019    Peripheral neuropathy 2017    Hurt back    Severe episode of recurrent major depressive disorder, without psychotic features 2022       Social History:  Social History     Socioeconomic History    Marital status:    Tobacco Use    Smoking status: Former     Current packs/day: 0.00     Average packs/day: 0.3 packs/day for 10.0 years (2.5 ttl pk-yrs)     Types: Cigarettes     Start date: 9/10/1973     Quit date: 3/17/1983     Years since quittin.1    Smokeless tobacco: Never    Tobacco comments:     Tore me up   Vaping Use    Vaping status: Never Used   Substance and Sexual Activity    Alcohol use: Not Currently    Drug use: Never    Sexual activity: Not Currently     Partners: Male     Birth control/protection: None     Comment: Old age       Family History:  Family History   Problem Relation Age of Onset    Anxiety disorder Mother     Dementia Mother     Depression Mother     Anxiety disorder Sister        Past Surgical History:  Past Surgical History:   Procedure Laterality Date    ABDOMINAL SURGERY      Gastric sleeve and hiatal hernia repair    BACK SURGERY  2018    CARDIAC CATHETERIZATION      COLONOSCOPY  2 times    ENDOSCOPY  Past? ,     EYE SURGERY  Lens for vision and cataracts    HERNIA REPAIR         Problem List:  Patient Active Problem List   Diagnosis    Recurrent major depressive disorder, in full remission    SYLVESTER (generalized anxiety disorder)    Insomnia due to other mental disorder       Allergy:   Allergies   Allergen Reactions    Phentermine Other (See Comments)     tinglilng  tinglilng      Vancomycin Rash, Hives and Itching     Itching not long after started.  Itching not  long after started.      Phentermine Hcl Unknown - Low Severity        Discontinued Medications:  There are no discontinued medications.        Current Medications:   Current Outpatient Medications   Medication Sig Dispense Refill    albuterol (PROVENTIL) (2.5 MG/3ML) 0.083% nebulizer solution USE 1 VIAL VIA NEBULIZER EVERY 4 HOURS AS NEEDED FOR WHEEZING OR SHORTNESS OF AIR      albuterol sulfate  (90 Base) MCG/ACT inhaler Ventolin  (90 Base) MCG/ACT Inhalation Aerosol Solution; Patient Sig: Ventolin  (90 Base) MCG/ACT Inhalation Aerosol Solution ; 0; 16-Jan-2015; Active      Azelastine-Fluticasone 137-50 MCG/ACT suspension  (Patient not taking: Reported on 5/16/2023)      busPIRone (BUSPAR) 15 MG tablet TAKE 1 TABLET BY MOUTH TWICE DAILY 180 tablet 1    cetirizine (zyrTEC) 10 MG tablet Take 1 tablet by mouth Daily.      Farxiga 10 MG tablet Take 10 mg by mouth Daily.      furosemide (LASIX) 20 MG tablet Take 1 tablet by mouth Daily.      levothyroxine (SYNTHROID, LEVOTHROID) 75 MCG tablet Take 1 tablet by mouth Daily.      losartan (COZAAR) 25 MG tablet Take 1 tablet by mouth Daily.      montelukast (SINGULAIR) 10 MG tablet Take 1 tablet by mouth Every Night.      rosuvastatin (CRESTOR) 10 MG tablet Take 1 tablet by mouth Daily.      traZODone (DESYREL) 50 MG tablet Take 1-2 tablets nightly as needed for sleep. 60 tablet 2    traZODone (DESYREL) 50 MG tablet Take 1-2 tablets nightly as needed for sleep. 60 tablet 2    Trelegy Ellipta 100-62.5-25 MCG/INH inhaler Inhale 1 puff Daily.      vilazodone (VIIBRYD) 40 MG tablet tablet Take 1 tablet by mouth Daily. 90 tablet 1     No current facility-administered medications for this visit.         Psychological ROS: positive for - anxiety, depression and sleep disturbances  negative for - behavioral disorder, concentration difficulties, decreased libido, disorientation, hallucinations, hostility, irritability, memory difficulties, mood swings,  obsessive thoughts, physical abuse, sexual abuse or suicidal ideation      Physical Exam:   There were no vitals taken for this visit.    Mental Status Exam:   Hygiene:   good  Cooperation:  Cooperative  Eye Contact:  Good  Psychomotor Behavior:  Appropriate  Affect:  Appropriate  Mood: normal  Hopelessness: Denies  Speech:  Normal  Thought Process:  Goal directed and Linear  Thought Content:  Normal  Suicidal:  None  Homicidal:  None  Hallucinations:  None  Delusion:  None  Memory:  Intact  Orientation:  Person, Place, Time and Situation  Reliability:  good  Insight:  Good  Judgement:  Good  Impulse Control:  Good  Physical/Medical Issues:  Yes chronic pain      Mental status exam was reviewed and compared to visit on 11/14/23 and appropriate updates were made.     PHQ-9 Depression Screening    Little interest or pleasure in doing things? 0-->not at all   Feeling down, depressed, or hopeless? 0-->not at all   Trouble falling or staying asleep, or sleeping too much? 1-->several days   Feeling tired or having little energy? 1-->several days   Poor appetite or overeating? 3-->nearly every day   Feeling bad about yourself - or that you are a failure or have let yourself or your family down? 0-->not at all   Trouble concentrating on things, such as reading the newspaper or watching television? 0-->not at all   Moving or speaking so slowly that other people could have noticed? Or the opposite - being so fidgety or restless that you have been moving around a lot more than usual? 1-->several days   Thoughts that you would be better off dead, or of hurting yourself in some way? 0-->not at all   PHQ-9 Total Score 6   If you checked off any problems, how difficult have these problems made it for you to do your work, take care of things at home, or get along with other people? not difficult at all        Never smoker    I advised Carlotta of the risks of tobacco use.     Result Review:    Labs:  No visits with results within 3  Month(s) from this visit.   Latest known visit with results is:   No results found for any previous visit.       Assessment & Plan   Diagnoses and all orders for this visit:    1. Major depressive disorder, recurrent episode, moderate (Primary)    2. SYLVESTER (generalized anxiety disorder)    3. Insomnia due to other mental disorder      Continue vilazodone 40mg daily.   Continue trazodone. Patient has been taking 100mg. She is going to try just taking 50mg and see how she does. She will call and let us know what dose to send.   Continue buspirone 15mg twice daily.      Visit Diagnoses:    ICD-10-CM ICD-9-CM   1. Major depressive disorder, recurrent episode, moderate  F33.1 296.32   2. SYLVESTER (generalized anxiety disorder)  F41.1 300.02   3. Insomnia due to other mental disorder  F51.05 300.9    F99 327.02       TREATMENT PLAN/GOALS: Continue supportive psychotherapy efforts and medications as indicated. Treatment and medication options discussed during today's visit. Patient ackowledged and verbally consented to continue with current treatment plan and was educated on the importance of compliance with treatment and follow-up appointments.    MEDICATION ISSUES:  INSPECT reviewed as expected    Discussed medication options and treatment plan of prescribed medication as well as the risks, benefits, and side effects including potential falls, possible impaired driving and metabolic adversities among others. Patient is agreeable to call the office with any worsening of symptoms or onset of side effects. Patient is agreeable to call 911 or go to the nearest ER should he/she begin having SI/HI. No medication side effects or related complaints today.     MEDS ORDERED DURING VISIT:  No orders of the defined types were placed in this encounter.      Return in about 3 months (around 8/2/2024).         This document has been electronically signed by JONATHAN Valadez  May 2, 2024 10:59 EDT    Part of this note may be an  electronic transcription/translation of spoken language to printed text using the Dragon Dictation System.    Answers for HPI/ROS submitted by the patient on 2/7/2023  Please describe your symptoms.: Depression and anxiety ongoing going through nasty divorce  Have you had these symptoms before?: Yes  How long have you been having these symptoms?: Greater than 2 weeks  Please list any medications you are currently taking for this condition.: BusPIRone 15 mg twice a day, Vilazodone 40, [id number]  Please describe any probable cause for these symptoms. : Going through a divorce. I filed over a year ago  What is the primary reason for your visit?: Other

## 2024-05-02 ENCOUNTER — OFFICE VISIT (OUTPATIENT)
Dept: PSYCHIATRY | Facility: CLINIC | Age: 69
End: 2024-05-02
Payer: MEDICARE

## 2024-05-02 DIAGNOSIS — F99 INSOMNIA DUE TO OTHER MENTAL DISORDER: Chronic | ICD-10-CM

## 2024-05-02 DIAGNOSIS — F51.05 INSOMNIA DUE TO OTHER MENTAL DISORDER: Chronic | ICD-10-CM

## 2024-05-02 DIAGNOSIS — F33.1 MAJOR DEPRESSIVE DISORDER, RECURRENT EPISODE, MODERATE: Primary | Chronic | ICD-10-CM

## 2024-05-02 DIAGNOSIS — F41.1 GAD (GENERALIZED ANXIETY DISORDER): Chronic | ICD-10-CM

## 2024-08-05 NOTE — PROGRESS NOTES
Subjective   Carlotta Cooper is a 69 y.o. female who presents today for follow-up for psychiatric medication management.     Chief Complaint:  Follow up for depression, anxiety, and insomnia.     History of Present Illness:     Medication adjustments last visit:  Continue vilazodone 40mg daily.   Continue trazodone. Patient has been taking 100mg. She is going to try just taking 50mg and see how she does. She will call and let us know what dose to send.   Continue buspirone 15mg twice daily.      She is here for follow up today. Still going through divorce (they were together for 49) years. She has purchased a patio home, but she and neighbors feel her ex- has people outside stalking her. She is still having relationship issues with her children. Her daughter does not talk to her, and she doesn't get to see her grandkids. She has also started having some trouble with her son, and feels like maybe he is avoiding her as well. She states she has been more down and depressed over this. I advised her to try to talk with her son, and see if she can get something worked out with him about seeing her grandkids. She would like to volunteer at her grandchildren's school, but she is afraid of hurting them in the end. Patient is in a very difficult social situation with ongoing stress from a lengthy contested divorce.   She feels as though medications are doing what they can. She would like to change the trazodone back to 50-100mg nightly. Advised to try taking 1.5 tablets to see if that helps with sleep. She says 50mg isn't enough, and sometimes 100mg feels like too much.   She denies any suicidal thoughts with any plan or intent.     5/2/24:  Patient states she is doing well. She is still in remediation with her divorce. They are trying to wrap it up, but her exhusband keeps appealing it. She is doing better overall with this situation. She is more at peace, and states she isn't living in fear. She is just waiting for it  to settle. Still some sadness that she doesn't get to see her twin grandkids due to her daughter still being upset.   Overall, she feels like medications are working well. She is much less depressed and less anxious.   Denies any suicidal thoughts.    She has several different mg options of the trazodone at home. She is going to try taking just 50mg or 75mg and will call back and let me know how it is working and what dose she would like me to send in.     Patient presents with symptoms and behaviors that are consistent with the following DSM-5 diagnoses:  1. Major depressive disorder  2. Generalized anxiety disorder  3. Insomnia    The following portions of the patient's history were reviewed and updated as appropriate: allergies, current medications, past family history, past medical history, past social history, past surgical history and problem list.    PAST OUTPATIENT TREATMENT  Diagnosis treated:  Affective Disorder, Anxiety/Panic Disorder  Treatment Type:  Medication Management  Prior Psychiatric Medications:  Pristiq - inefficacy after months of use.  Viibryd-effective at 40 mg.  Trazodone-improved insomnia.  Buspirone-improved anxiety.  Support Groups:  None  Sequelae Of Mental Disorder:  job disruption, family disruption, emotional distress    Interval History  Improved    Side Effects  None      Past Medical History:  Past Medical History:   Diagnosis Date    Anxiety All my life    Chronic pain disorder     Depression Menopause - maybe 15 years ago    Disease of thyroid gland 2020    Liver disease Fatty liver disease 05/2019    Peripheral neuropathy 01/21/2017    Hurt back    Severe episode of recurrent major depressive disorder, without psychotic features 01/28/2022       Social History:  Social History     Socioeconomic History    Marital status:    Tobacco Use    Smoking status: Former     Current packs/day: 0.00     Average packs/day: 0.3 packs/day for 10.0 years (2.5 ttl pk-yrs)     Types:  Cigarettes     Start date: 9/10/1973     Quit date: 3/17/1983     Years since quittin.4    Smokeless tobacco: Never    Tobacco comments:     Tore me up   Vaping Use    Vaping status: Never Used   Substance and Sexual Activity    Alcohol use: Not Currently    Drug use: Never    Sexual activity: Not Currently     Partners: Male     Birth control/protection: None     Comment: Old age       Family History:  Family History   Problem Relation Age of Onset    Anxiety disorder Mother     Dementia Mother     Depression Mother     Anxiety disorder Sister        Past Surgical History:  Past Surgical History:   Procedure Laterality Date    ABDOMINAL SURGERY      Gastric sleeve and hiatal hernia repair    BACK SURGERY  2018    CARDIAC CATHETERIZATION      COLONOSCOPY  2 times    ENDOSCOPY  Past? ,     EYE SURGERY  Lens for vision and cataracts    HERNIA REPAIR         Problem List:  Patient Active Problem List   Diagnosis    Recurrent major depressive disorder, in full remission    SYLVESTER (generalized anxiety disorder)    Insomnia due to other mental disorder       Allergy:   Allergies   Allergen Reactions    Phentermine Other (See Comments)     tinglilng  tinglilng      Vancomycin Rash, Hives and Itching     Itching not long after started.  Itching not long after started.      Phentermine Hcl Unknown - Low Severity        Discontinued Medications:  Medications Discontinued During This Encounter   Medication Reason    Azelastine-Fluticasone 137-50 MCG/ACT suspension     traZODone (DESYREL) 50 MG tablet Reorder           Current Medications:   Current Outpatient Medications   Medication Sig Dispense Refill    traZODone (DESYREL) 50 MG tablet Take 1-2 tablets nightly as needed for sleep. 180 tablet 1    albuterol (PROVENTIL) (2.5 MG/3ML) 0.083% nebulizer solution USE 1 VIAL VIA NEBULIZER EVERY 4 HOURS AS NEEDED FOR WHEEZING OR SHORTNESS OF AIR      albuterol sulfate  (90 Base) MCG/ACT inhaler  Ventolin  (90 Base) MCG/ACT Inhalation Aerosol Solution; Patient Sig: Ventolin  (90 Base) MCG/ACT Inhalation Aerosol Solution ; 0; 16-Jan-2015; Active      busPIRone (BUSPAR) 15 MG tablet TAKE 1 TABLET BY MOUTH TWICE DAILY 180 tablet 1    cetirizine (zyrTEC) 10 MG tablet Take 1 tablet by mouth Daily.      Farxiga 10 MG tablet Take 10 mg by mouth Daily.      furosemide (LASIX) 20 MG tablet Take 1 tablet by mouth Daily.      levothyroxine (SYNTHROID, LEVOTHROID) 75 MCG tablet Take 1 tablet by mouth Daily.      losartan (COZAAR) 25 MG tablet Take 1 tablet by mouth Daily.      montelukast (SINGULAIR) 10 MG tablet Take 1 tablet by mouth Every Night.      rosuvastatin (CRESTOR) 10 MG tablet Take 1 tablet by mouth Daily.      traZODone (DESYREL) 50 MG tablet Take 1-2 tablets nightly as needed for sleep. 60 tablet 2    Trelegy Ellipta 100-62.5-25 MCG/INH inhaler Inhale 1 puff Daily.      vilazodone (VIIBRYD) 40 MG tablet tablet Take 1 tablet by mouth Daily. 90 tablet 1     No current facility-administered medications for this visit.     Physical Exam:   There were no vitals taken for this visit.    MENTAL STATUS EXAM   General Appearance:  Cleanly groomed and dressed  Eye Contact:  Good eye contact  Attitude:  Cooperative  Motor Activity:  Normal gait, posture  Muscle Strength:  Normal  Speech:  Normal rate, tone, volume  Language:  Spontaneous  Mood and affect:  Depressed and other  Other Comment:  Tearful  Hopelessness:  Denies  Loneliness: Denies  Thought Process:  Logical and goal-directed  Associations/ Thought Content:  No delusions  Hallucinations:  None  Suicidal Ideations:  Not present  Homicidal Ideation:  Not present  Sensorium:  Alert  Orientation:  Person, place, time and situation  Immediate Recall, Recent, and Remote Memory:  Intact  Attention Span/ Concentration:  Good  Fund of Knowledge:  Appropriate for age and educational level  Intellectual Functioning:  Average range  Insight:   Good  Judgement:  Good  Reliability:  Good  Impulse Control:  Good       PHQ-9 Depression Screening    Little interest or pleasure in doing things? 0-->not at all   Feeling down, depressed, or hopeless? 2-->more than half the days   Trouble falling or staying asleep, or sleeping too much? 1-->several days   Feeling tired or having little energy? 0-->not at all   Poor appetite or overeating? 3-->nearly every day   Feeling bad about yourself - or that you are a failure or have let yourself or your family down? 3-->nearly every day   Trouble concentrating on things, such as reading the newspaper or watching television? 1-->several days   Moving or speaking so slowly that other people could have noticed? Or the opposite - being so fidgety or restless that you have been moving around a lot more than usual? 0-->not at all   Thoughts that you would be better off dead, or of hurting yourself in some way? 3-->nearly every day   PHQ-9 Total Score 13   If you checked off any problems, how difficult have these problems made it for you to do your work, take care of things at home, or get along with other people?          Never smoker    I advised Carlotta of the risks of tobacco use.     Result Review:    Labs:  No visits with results within 3 Month(s) from this visit.   Latest known visit with results is:   No results found for any previous visit.       Assessment & Plan   Diagnoses and all orders for this visit:    1. Major depressive disorder, recurrent episode, moderate (Primary)    2. SYLVESTER (generalized anxiety disorder)    3. Insomnia due to other mental disorder  -     traZODone (DESYREL) 50 MG tablet; Take 1-2 tablets nightly as needed for sleep.  Dispense: 180 tablet; Refill: 1        Continue vilazodone 40mg daily.   Continue trazodone. Change to 50-100mg nightly. Patient is going to try taking 1.5 tablets.   Continue buspirone 15mg twice daily.      Visit Diagnoses:    ICD-10-CM ICD-9-CM   1. Major depressive disorder,  recurrent episode, moderate  F33.1 296.32   2. SYLVESTER (generalized anxiety disorder)  F41.1 300.02   3. Insomnia due to other mental disorder  F51.05 300.9    F99 327.02       TREATMENT PLAN/GOALS: Continue supportive psychotherapy efforts and medications as indicated. Treatment and medication options discussed during today's visit. Patient ackowledged and verbally consented to continue with current treatment plan and was educated on the importance of compliance with treatment and follow-up appointments.    MEDICATION ISSUES:  INSPECT reviewed as expected    Discussed medication options and treatment plan of prescribed medication as well as the risks, benefits, and side effects including potential falls, possible impaired driving and metabolic adversities among others. Patient is agreeable to call the office with any worsening of symptoms or onset of side effects. Patient is agreeable to call 911 or go to the nearest ER should he/she begin having SI/HI. No medication side effects or related complaints today.     MEDS ORDERED DURING VISIT:  New Medications Ordered This Visit   Medications    traZODone (DESYREL) 50 MG tablet     Sig: Take 1-2 tablets nightly as needed for sleep.     Dispense:  180 tablet     Refill:  1       Return in about 3 months (around 11/6/2024).         This document has been electronically signed by JONATHAN Valadez  August 6, 2024 17:09 EDT    Part of this note may be an electronic transcription/translation of spoken language to printed text using the Dragon Dictation System.    Answers for HPI/ROS submitted by the patient on 2/7/2023  Please describe your symptoms.: Depression and anxiety ongoing going through nasty divorce  Have you had these symptoms before?: Yes  How long have you been having these symptoms?: Greater than 2 weeks  Please list any medications you are currently taking for this condition.: BusPIRone 15 mg twice a day, Vilazodone 40, [id number]  Please describe any  probable cause for these symptoms. : Going through a divorce. I filed over a year ago  What is the primary reason for your visit?: Other

## 2024-08-06 ENCOUNTER — OFFICE VISIT (OUTPATIENT)
Dept: PSYCHIATRY | Facility: CLINIC | Age: 69
End: 2024-08-06
Payer: MEDICARE

## 2024-08-06 DIAGNOSIS — F41.1 GAD (GENERALIZED ANXIETY DISORDER): Chronic | ICD-10-CM

## 2024-08-06 DIAGNOSIS — F51.05 INSOMNIA DUE TO OTHER MENTAL DISORDER: Chronic | ICD-10-CM

## 2024-08-06 DIAGNOSIS — F33.1 MAJOR DEPRESSIVE DISORDER, RECURRENT EPISODE, MODERATE: Primary | Chronic | ICD-10-CM

## 2024-08-06 DIAGNOSIS — F99 INSOMNIA DUE TO OTHER MENTAL DISORDER: Chronic | ICD-10-CM

## 2024-08-06 PROCEDURE — 1159F MED LIST DOCD IN RCRD: CPT

## 2024-08-06 PROCEDURE — 99214 OFFICE O/P EST MOD 30 MIN: CPT

## 2024-08-06 PROCEDURE — 1160F RVW MEDS BY RX/DR IN RCRD: CPT

## 2024-08-06 RX ORDER — TRAZODONE HYDROCHLORIDE 50 MG/1
TABLET ORAL
Qty: 180 TABLET | Refills: 1 | Status: SHIPPED | OUTPATIENT
Start: 2024-08-06

## 2024-09-06 DIAGNOSIS — F33.1 MAJOR DEPRESSIVE DISORDER, RECURRENT EPISODE, MODERATE: Chronic | ICD-10-CM

## 2024-09-06 DIAGNOSIS — F41.1 GAD (GENERALIZED ANXIETY DISORDER): Chronic | ICD-10-CM

## 2024-09-06 RX ORDER — VILAZODONE HYDROCHLORIDE 40 MG/1
40 TABLET ORAL DAILY
Qty: 90 TABLET | Refills: 1 | Status: SHIPPED | OUTPATIENT
Start: 2024-09-06

## 2024-09-10 DIAGNOSIS — F41.1 GAD (GENERALIZED ANXIETY DISORDER): Chronic | ICD-10-CM

## 2024-09-10 RX ORDER — BUSPIRONE HYDROCHLORIDE 15 MG/1
15 TABLET ORAL 2 TIMES DAILY
Qty: 180 TABLET | Refills: 1 | Status: SHIPPED | OUTPATIENT
Start: 2024-09-10

## 2024-11-05 NOTE — PROGRESS NOTES
[facility]   [address], [address]  [place] [address]  [phone]  Rachelle Peterson, MSN, APRN, PMHNP-BC    Subjective   Carlotta Cooper is a 69 y.o. female who presents today for follow-up for psychiatric medication management.     Chief Complaint:  Follow up for depression, anxiety, and insomnia.     History of Present Illness:     Medication adjustments last visit:  Continue vilazodone 40mg daily.   Continue trazodone. Change to 50-100mg nightly. Patient is going to try taking 1.5 tablets.   Continue buspirone 15mg twice daily.      11/6/24: Patient here for follow up. She states she is doing better. She feels like God has gotten her through. Also time has helped from the divorce. They are still in appeals with that. She moved into a patio home and this is going well for her. She states she has good social support there, and they get together a lot for coffee, or dessert. The women there have also looked after her and helped get rid of people her ex- had stalking her. She says moving there has been a blessing to her.   She is still out of contact with her daughter and grandkids, and this still hurts her. She is hoping she will come around at some point. She still gets to see her son and his kids.   Denies any SI/HI/AVH.   Overall, she feels like she is doing well. She is ok with a 6 month follow up. Advised to call if condition worsens and she feels like she needs to get in sooner.       8/6/24  She is here for follow up today. Still going through divorce (they were together for 49) years. She has purchased a patio home, but she and neighbors feel her ex- has people outside stalking her. She is still having relationship issues with her children. Her daughter does not talk to her, and she doesn't get to see her grandkids. She has also started having some trouble with her son, and feels like maybe he is avoiding her as well. She states she has been more  down and depressed over this. I advised her to try to talk with her son, and see if she can get something worked out with him about seeing her grandkids. She would like to volunteer at her grandchildren's school, but she is afraid of hurting them in the end. Patient is in a very difficult social situation with ongoing stress from a lengthy contested divorce.   She feels as though medications are doing what they can. She would like to change the trazodone back to 50-100mg nightly. Advised to try taking 1.5 tablets to see if that helps with sleep. She says 50mg isn't enough, and sometimes 100mg feels like too much.   She denies any suicidal thoughts with any plan or intent.     5/2/24:  Patient states she is doing well. She is still in remediation with her divorce. They are trying to wrap it up, but her exhusband keeps appealing it. She is doing better overall with this situation. She is more at peace, and states she isn't living in fear. She is just waiting for it to settle. Still some sadness that she doesn't get to see her twin grandkids due to her daughter still being upset.   Overall, she feels like medications are working well. She is much less depressed and less anxious.   Denies any suicidal thoughts.    She has several different mg options of the trazodone at home. She is going to try taking just 50mg or 75mg and will call back and let me know how it is working and what dose she would like me to send in.     Patient presents with symptoms and behaviors that are consistent with the following DSM-5 diagnoses:  1. Major depressive disorder  2. Generalized anxiety disorder  3. Insomnia    The following portions of the patient's history were reviewed and updated as appropriate: allergies, current medications, past family history, past medical history, past social history, past surgical history and problem list.    PAST OUTPATIENT TREATMENT  Diagnosis treated:  Affective Disorder, Anxiety/Panic Disorder  Treatment  Type:  Medication Management  Prior Psychiatric Medications:  Pristiq - inefficacy after months of use.  Viibryd-effective at 40 mg.  Trazodone-improved insomnia.  Buspirone-improved anxiety.  Support Groups:  None  Sequelae Of Mental Disorder:  job disruption, family disruption, emotional distress    Interval History  Improved    Side Effects  None      Past Medical History:  Past Medical History:   Diagnosis Date    Anxiety All my life    Chronic pain disorder     Depression Menopause - maybe 15 years ago    Disease of thyroid gland     Liver disease Fatty liver disease 2019    Peripheral neuropathy 2017    Hurt back    Severe episode of recurrent major depressive disorder, without psychotic features 2022       Social History:  Social History     Socioeconomic History    Marital status:    Tobacco Use    Smoking status: Former     Current packs/day: 0.00     Average packs/day: 0.3 packs/day for 10.0 years (2.5 ttl pk-yrs)     Types: Cigarettes     Start date: 9/10/1973     Quit date: 3/17/1983     Years since quittin.6    Smokeless tobacco: Never    Tobacco comments:     Tore me up   Vaping Use    Vaping status: Never Used   Substance and Sexual Activity    Alcohol use: Not Currently    Drug use: Never    Sexual activity: Not Currently     Partners: Male     Birth control/protection: None     Comment: Old age       Family History:  Family History   Problem Relation Age of Onset    Anxiety disorder Mother     Dementia Mother     Depression Mother     Anxiety disorder Sister        Past Surgical History:  Past Surgical History:   Procedure Laterality Date    ABDOMINAL SURGERY      Gastric sleeve and hiatal hernia repair    BACK SURGERY  2018    CARDIAC CATHETERIZATION      COLONOSCOPY  2 times    ENDOSCOPY  Past? ,     EYE SURGERY  Lens for vision and cataracts    HERNIA REPAIR         Problem List:  Patient Active Problem List   Diagnosis    Recurrent major  depressive disorder, in full remission    SYLVESTER (generalized anxiety disorder)    Insomnia due to other mental disorder       Allergy:   Allergies   Allergen Reactions    Phentermine Other (See Comments)     tinglilng  tinglilng      Vancomycin Rash, Hives and Itching     Itching not long after started.  Itching not long after started.      Phentermine Hcl Unknown - Low Severity        Discontinued Medications:  Medications Discontinued During This Encounter   Medication Reason    traZODone (DESYREL) 50 MG tablet              Current Medications:   Current Outpatient Medications   Medication Sig Dispense Refill    estradiol (ESTRACE) 0.1 MG/GM vaginal cream Insert 0.5 g into the vagina 3 (Three) Times a Week.      fluocinolone acetonide (DERMOTIC) 0.01 % oil otic oil Administer 2 drops into both ears As Needed (dry skin).      albuterol (PROVENTIL) (2.5 MG/3ML) 0.083% nebulizer solution USE 1 VIAL VIA NEBULIZER EVERY 4 HOURS AS NEEDED FOR WHEEZING OR SHORTNESS OF AIR      albuterol sulfate  (90 Base) MCG/ACT inhaler Ventolin  (90 Base) MCG/ACT Inhalation Aerosol Solution; Patient Sig: Ventolin  (90 Base) MCG/ACT Inhalation Aerosol Solution ; 0; 16-Jan-2015; Active      amLODIPine (NORVASC) 5 MG tablet Take 1 tablet by mouth Daily.      busPIRone (BUSPAR) 15 MG tablet TAKE 1 TABLET BY MOUTH TWICE DAILY 180 tablet 1    cetirizine (zyrTEC) 10 MG tablet Take 1 tablet by mouth Daily.      Farxiga 10 MG tablet Take 10 mg by mouth Daily.      furosemide (LASIX) 20 MG tablet Take 1 tablet by mouth Daily.      levothyroxine (SYNTHROID, LEVOTHROID) 75 MCG tablet Take 1 tablet by mouth Daily.      losartan (COZAAR) 25 MG tablet Take 1 tablet by mouth Daily.      montelukast (SINGULAIR) 10 MG tablet Take 1 tablet by mouth Every Night.      rosuvastatin (CRESTOR) 10 MG tablet Take 1 tablet by mouth Daily.      saccharomyces boulardii (FLORASTOR) 250 MG capsule Take 1 capsule by mouth Daily.      traZODone  (DESYREL) 50 MG tablet Take 1-2 tablets nightly as needed for sleep. 180 tablet 1    Trelegy Ellipta 100-62.5-25 MCG/INH inhaler Inhale 1 puff Daily.      vilazodone (VIIBRYD) 40 MG tablet tablet TAKE 1 TABLET BY MOUTH DAILY 90 tablet 1     No current facility-administered medications for this visit.     Physical Exam:   There were no vitals taken for this visit.    MENTAL STATUS EXAM   General Appearance:  Cleanly groomed and dressed  Eye Contact:  Good eye contact  Attitude:  Cooperative  Motor Activity:  Normal gait, posture  Muscle Strength:  Normal  Speech:  Normal rate, tone, volume  Language:  Spontaneous  Mood and affect:  Normal, pleasant  Hopelessness:  Denies  Loneliness: Denies  Thought Process:  Logical and goal-directed  Associations/ Thought Content:  No delusions  Hallucinations:  None  Suicidal Ideations:  Not present  Homicidal Ideation:  Not present  Sensorium:  Alert  Orientation:  Person, place, time and situation  Immediate Recall, Recent, and Remote Memory:  Intact  Attention Span/ Concentration:  Good  Fund of Knowledge:  Appropriate for age and educational level  Intellectual Functioning:  Average range  Insight:  Good  Judgement:  Good  Reliability:  Good  Impulse Control:  Good    PHQ-9    Little interest or pleasure in doing things? Not at all   Feeling down, depressed, or hopeless? Not at all   Trouble falling or staying asleep, or sleeping too much? Over half   Feeling tired or having little energy? Several days   Poor appetite or overeating? Almost all   Feeling bad about yourself - or that you are a failure or have let yourself or your family down? Over half   Trouble concentrating on things, such as reading the newspaper or watching television? Not at all   Moving or speaking so slowly that other people could have noticed? Or the opposite - being so fidgety or restless that you have been moving around a lot more than usual? Not at all   Thoughts that you would be better off dead, or  of hurting yourself in some way? Not at all   PHQ-9 Total Score 8   If you checked off any problems, how difficult have these problems made it for you to do your work, take care of things at home, or get along with other people? Not difficult at all      GAD7 Documentation:  Feeling nervous, anxious or on edge 1   Not being able to stop or control worrying 0   Worrying too much about different things 1   Trouble relaxing 0   Being so restless that it is hard to sit still 0   Becoming easily annoyed or irritable 0   Feeling Afraid as if something awful might happen 2   SYLVESTER Total Score 4   How difficult have these problems made it for you? Not difficult at all        Never smoker    I advised Carlotta of the risks of tobacco use.     Result Review:    Labs:  No visits with results within 3 Month(s) from this visit.   Latest known visit with results is:   No results found for any previous visit.       Assessment & Plan   Diagnoses and all orders for this visit:    1. Major depressive disorder, recurrent episode, moderate (Primary)    2. SYLVESTER (generalized anxiety disorder)    3. Insomnia due to other mental disorder      Continue vilazodone 40mg daily.   Continue trazodone. Change to 50-100mg nightly. Patient is going to try taking 1.5 tablets.   Continue buspirone 15mg twice daily.      Visit Diagnoses:    ICD-10-CM ICD-9-CM   1. Major depressive disorder, recurrent episode, moderate  F33.1 296.32   2. SYLVESTER (generalized anxiety disorder)  F41.1 300.02   3. Insomnia due to other mental disorder  F51.05 300.9    F99 327.02         TREATMENT PLAN/GOALS: Continue supportive psychotherapy efforts and medications as indicated. Treatment and medication options discussed during today's visit. Patient ackowledged and verbally consented to continue with current treatment plan and was educated on the importance of compliance with treatment and follow-up appointments.    MEDICATION ISSUES:  INSPECT reviewed as expected    Discussed  medication options and treatment plan of prescribed medication as well as the risks, benefits, and side effects including potential falls, possible impaired driving and metabolic adversities among others. Patient is agreeable to call the office with any worsening of symptoms or onset of side effects. Patient is agreeable to call 911 or go to the nearest ER should he/she begin having SI/HI. No medication side effects or related complaints today.     MEDS ORDERED DURING VISIT:  No orders of the defined types were placed in this encounter.      Return in about 6 months (around 5/6/2025) for 30 minute follow up .         This document has been electronically signed by JONATHAN Valadez  November 6, 2024 10:37 EST    Part of this note may be an electronic transcription/translation of spoken language to printed text using the Dragon Dictation System.    Answers for HPI/ROS submitted by the patient on 2/7/2023  Please describe your symptoms.: Depression and anxiety ongoing going through nasty divorce  Have you had these symptoms before?: Yes  How long have you been having these symptoms?: Greater than 2 weeks  Please list any medications you are currently taking for this condition.: BusPIRone 15 mg twice a day, Vilazodone 40, [id number]  Please describe any probable cause for these symptoms. : Going through a divorce. I filed over a year ago  What is the primary reason for your visit?: Other

## 2024-11-06 ENCOUNTER — OFFICE VISIT (OUTPATIENT)
Dept: PSYCHIATRY | Facility: CLINIC | Age: 69
End: 2024-11-06
Payer: MEDICARE

## 2024-11-06 DIAGNOSIS — F41.1 GAD (GENERALIZED ANXIETY DISORDER): Chronic | ICD-10-CM

## 2024-11-06 DIAGNOSIS — F51.05 INSOMNIA DUE TO OTHER MENTAL DISORDER: Chronic | ICD-10-CM

## 2024-11-06 DIAGNOSIS — F33.1 MAJOR DEPRESSIVE DISORDER, RECURRENT EPISODE, MODERATE: Primary | Chronic | ICD-10-CM

## 2024-11-06 DIAGNOSIS — F99 INSOMNIA DUE TO OTHER MENTAL DISORDER: Chronic | ICD-10-CM

## 2024-11-06 RX ORDER — FLUOCINOLONE ACETONIDE 0.11 MG/ML
2 OIL AURICULAR (OTIC) AS NEEDED
COMMUNITY
Start: 2024-08-23

## 2024-11-06 RX ORDER — SACCHAROMYCES BOULARDII 250 MG
250 CAPSULE ORAL DAILY
COMMUNITY

## 2024-11-06 RX ORDER — AMLODIPINE BESYLATE 5 MG/1
5 TABLET ORAL DAILY
COMMUNITY

## 2024-11-06 RX ORDER — ESTRADIOL 0.1 MG/G
0.5 CREAM VAGINAL 3 TIMES WEEKLY
COMMUNITY
Start: 2024-10-23

## 2024-12-07 DIAGNOSIS — F41.1 GAD (GENERALIZED ANXIETY DISORDER): Chronic | ICD-10-CM

## 2024-12-09 RX ORDER — BUSPIRONE HYDROCHLORIDE 15 MG/1
15 TABLET ORAL 2 TIMES DAILY
Qty: 180 TABLET | Refills: 1 | OUTPATIENT
Start: 2024-12-09

## 2025-02-10 DIAGNOSIS — F51.05 INSOMNIA DUE TO OTHER MENTAL DISORDER: Chronic | ICD-10-CM

## 2025-02-10 DIAGNOSIS — F99 INSOMNIA DUE TO OTHER MENTAL DISORDER: Chronic | ICD-10-CM

## 2025-02-10 RX ORDER — TRAZODONE HYDROCHLORIDE 50 MG/1
TABLET, FILM COATED ORAL
Qty: 180 TABLET | Refills: 1 | Status: SHIPPED | OUTPATIENT
Start: 2025-02-10

## 2025-02-10 NOTE — TELEPHONE ENCOUNTER
Rx Refill Note  Requested Prescriptions     Pending Prescriptions Disp Refills    traZODone (DESYREL) 50 MG tablet [Pharmacy Med Name: TRAZODONE 50MG TABLETS] 180 tablet 1     Sig: TAKE 1 TO 2 TABLETS BY MOUTH EVERY NIGHT AS NEEDED FOR SLEEP      Last office visit with prescribing clinician: 11/6/2024   Last telemedicine visit with prescribing clinician: Visit date not found   Next office visit with prescribing clinician: 5/6/2025   Office Visit with Rachelle Peterson APRN (11/06/2024)                       Would you like a call back once the refill request has been completed: [] Yes [] No    If the office needs to give you a call back, can they leave a voicemail: [] Yes [] No    Joy Meraz MA  02/10/25, 11:57 EST

## 2025-02-25 DIAGNOSIS — F41.1 GAD (GENERALIZED ANXIETY DISORDER): Chronic | ICD-10-CM

## 2025-02-27 RX ORDER — BUSPIRONE HYDROCHLORIDE 15 MG/1
15 TABLET ORAL 2 TIMES DAILY
Qty: 180 TABLET | Refills: 1 | Status: SHIPPED | OUTPATIENT
Start: 2025-02-27

## 2025-03-10 DIAGNOSIS — F33.1 MAJOR DEPRESSIVE DISORDER, RECURRENT EPISODE, MODERATE: Chronic | ICD-10-CM

## 2025-03-10 DIAGNOSIS — F41.1 GAD (GENERALIZED ANXIETY DISORDER): Chronic | ICD-10-CM

## 2025-03-10 NOTE — TELEPHONE ENCOUNTER
Rx Refill Note  Requested Prescriptions     Pending Prescriptions Disp Refills    vilazodone (VIIBRYD) 40 MG tablet tablet [Pharmacy Med Name: VILAZODONE 40MG TABLETS] 90 tablet 1     Sig: TAKE 1 TABLET BY MOUTH DAILY        Last office visit with prescribing clinician: 11/6/2024     Next office visit with prescribing clinician: 5/6/2025     Office Visit with Rachelle Peterson APRN (11/06/2024)     Maria C Jacobs  03/10/25, 12:08 EDT

## 2025-03-11 RX ORDER — VILAZODONE HYDROCHLORIDE 40 MG/1
40 TABLET ORAL DAILY
Qty: 90 TABLET | Refills: 1 | Status: SHIPPED | OUTPATIENT
Start: 2025-03-11

## 2025-05-05 NOTE — PROGRESS NOTES
[facility]   [address], [address]  [place] [address]  [phone]  Rachelle Peterson, MSN, APRN, PMHNP-BC    Subjective   Carlotta Cooper is a 70 y.o. female who presents today for follow-up for psychiatric medication management.     Chief Complaint:  Follow up for depression, anxiety, and insomnia.     History of Present Illness:     Medication adjustments last visit:  Continue vilazodone 40mg daily.   Continue trazodone. Change to 50-100mg nightly. Patient is going to try taking 1.5 tablets.   Continue buspirone 15mg twice daily.      Patient or patient representative verbalized consent for the use of Ambient Listening during the visit with  JONATHAN Valadez for chart documentation. 5/6/2025  10:01 EDT  History of Present Illness  The patient is a 70-year-old female who presents for psychiatric medication management follow-up.    Major Depressive Disorder  - vilazodone working well.   -Patient still endorses some grief and sadness, related to not getting to see her grandchildren, due to being estranged with her daughter.   -Still dealing with legal wang with ex- to finalize her divorce.   -She is engaged in [employer] and states she has good support there, good neighbors.   -Denies and SI.     Generalized Anxiety Disorder  - Buspirone working well.     Insomnia  -Trazodone working well.     11/6/24: Patient here for follow up. She states she is doing better. She feels like God has gotten her through. Also time has helped from the divorce. They are still in appeals with that. She moved into a patio home and this is going well for her. She states she has good social support there, and they get together a lot for coffee, or dessert. The women there have also looked after her and helped get rid of people her ex- had stalking her. She says moving there has been a blessing to her.   She is still out of contact with her daughter and grandkids, and this  still hurts her. She is hoping she will come around at some point. She still gets to see her son and his kids.   Denies any SI/HI/AVH.   Overall, she feels like she is doing well. She is ok with a 6 month follow up. Advised to call if condition worsens and she feels like she needs to get in sooner.       8/6/24  She is here for follow up today. Still going through divorce (they were together for 49) years. She has purchased a patio home, but she and neighbors feel her ex- has people outside stalking her. She is still having relationship issues with her children. Her daughter does not talk to her, and she doesn't get to see her grandkids. She has also started having some trouble with her son, and feels like maybe he is avoiding her as well. She states she has been more down and depressed over this. I advised her to try to talk with her son, and see if she can get something worked out with him about seeing her grandkids. She would like to volunteer at her grandchildren's school, but she is afraid of hurting them in the end. Patient is in a very difficult social situation with ongoing stress from a lengthy contested divorce.   She feels as though medications are doing what they can. She would like to change the trazodone back to 50-100mg nightly. Advised to try taking 1.5 tablets to see if that helps with sleep. She says 50mg isn't enough, and sometimes 100mg feels like too much.   She denies any suicidal thoughts with any plan or intent.     5/2/24:  Patient states she is doing well. She is still in remediation with her divorce. They are trying to wrap it up, but her exhusband keeps appealing it. She is doing better overall with this situation. She is more at peace, and states she isn't living in fear. She is just waiting for it to settle. Still some sadness that she doesn't get to see her twin grandkids due to her daughter still being upset.   Overall, she feels like medications are working well. She is much  less depressed and less anxious.   Denies any suicidal thoughts.    She has several different mg options of the trazodone at home. She is going to try taking just 50mg or 75mg and will call back and let me know how it is working and what dose she would like me to send in.     Patient presents with symptoms and behaviors that are consistent with the following DSM-5 diagnoses:  1. Major depressive disorder  2. Generalized anxiety disorder  3. Insomnia    The following portions of the patient's history were reviewed and updated as appropriate: allergies, current medications, past family history, past medical history, past social history, past surgical history and problem list.    PAST OUTPATIENT TREATMENT  Diagnosis treated:  Affective Disorder, Anxiety/Panic Disorder  Treatment Type:  Medication Management  Prior Psychiatric Medications:  Pristiq - inefficacy after months of use.  Viibryd-effective at 40 mg.  Trazodone-improved insomnia.  Buspirone-improved anxiety.  Support Groups:  None  Sequelae Of Mental Disorder:  job disruption, family disruption, emotional distress    Interval History  Improved    Side Effects  None      Past Medical History:  Past Medical History:   Diagnosis Date    Anxiety All my life    Chronic pain disorder     Depression Menopause - maybe 15 years ago    Disease of thyroid gland     Liver disease Fatty liver disease 2019    Peripheral neuropathy 2017    Hurt back    Severe episode of recurrent major depressive disorder, without psychotic features 2022       Social History:  Social History     Socioeconomic History    Marital status:    Tobacco Use    Smoking status: Former     Current packs/day: 0.00     Average packs/day: 0.3 packs/day for 10.0 years (2.5 ttl pk-yrs)     Types: Cigarettes     Start date: 9/10/1973     Quit date: 3/17/1983     Years since quittin.1    Smokeless tobacco: Never    Tobacco comments:     Tore me up   Vaping Use    Vaping  status: Never Used   Substance and Sexual Activity    Alcohol use: Not Currently    Drug use: Never    Sexual activity: Not Currently     Partners: Male     Birth control/protection: None     Comment: Old age       Family History:  Family History   Problem Relation Age of Onset    Anxiety disorder Mother     Dementia Mother     Depression Mother     Anxiety disorder Sister        Past Surgical History:  Past Surgical History:   Procedure Laterality Date    ABDOMINAL SURGERY  2010    Gastric sleeve and hiatal hernia repair    BACK SURGERY  03/2018    CARDIAC CATHETERIZATION  2010    COLONOSCOPY  2 times    ENDOSCOPY  Past? 2010, 2019    EYE SURGERY  Lens for vision and cataracts    HERNIA REPAIR  2010       Problem List:  Patient Active Problem List   Diagnosis    Recurrent major depressive disorder, in full remission    SYLVESTER (generalized anxiety disorder)    Insomnia due to other mental disorder       Allergy:   Allergies   Allergen Reactions    Phentermine Other (See Comments)     tinglilng  tinglilng      Vancomycin Rash, Hives and Itching     Itching not long after started.  Itching not long after started.      Phentermine Hcl Unknown - Low Severity        Discontinued Medications:  Medications Discontinued During This Encounter   Medication Reason    albuterol sulfate  (90 Base) MCG/ACT inhaler     albuterol (PROVENTIL) (2.5 MG/3ML) 0.083% nebulizer solution     losartan (COZAAR) 25 MG tablet                Current Medications:   Current Outpatient Medications   Medication Sig Dispense Refill    Airsupra 90-80 MCG/ACT aerosol Take 2 puffs by mouth Daily.      Azelastine-Fluticasone 137-50 MCG/ACT suspension 1 spray by Alternating Nares route 2 (Two) Times a Day. shake liquid      hydrALAZINE (APRESOLINE) 25 MG tablet Take 1 tablet by mouth As Needed.      traZODone (DESYREL) 50 MG tablet TAKE 1 TO 2 TABLETS BY MOUTH EVERY NIGHT AS NEEDED FOR SLEEP 180 tablet 1    amLODIPine (NORVASC) 5 MG tablet Take 1  tablet by mouth Daily.      busPIRone (BUSPAR) 15 MG tablet TAKE 1 TABLET BY MOUTH TWICE DAILY 180 tablet 1    cetirizine (zyrTEC) 10 MG tablet Take 1 tablet by mouth Daily.      estradiol (ESTRACE) 0.1 MG/GM vaginal cream Insert 0.5 g into the vagina 3 (Three) Times a Week.      Farxiga 10 MG tablet Take 10 mg by mouth Daily.      fluocinolone acetonide (DERMOTIC) 0.01 % oil otic oil Administer 2 drops into both ears As Needed (dry skin).      furosemide (LASIX) 20 MG tablet Take 1 tablet by mouth Daily.      levothyroxine (SYNTHROID, LEVOTHROID) 75 MCG tablet Take 1 tablet by mouth Daily.      montelukast (SINGULAIR) 10 MG tablet Take 1 tablet by mouth Every Night.      Ozempic, 1 MG/DOSE, 4 MG/3ML solution pen-injector Inject 1 mg under the skin into the appropriate area as directed 1 (One) Time Per Week.      rosuvastatin (CRESTOR) 10 MG tablet Take 1 tablet by mouth Daily.      saccharomyces boulardii (FLORASTOR) 250 MG capsule Take 1 capsule by mouth Daily.      Trelegy Ellipta 100-62.5-25 MCG/INH inhaler Inhale 1 puff Daily.      valsartan (DIOVAN) 40 MG tablet Take 1 tablet by mouth 2 (Two) Times a Day.      vilazodone (VIIBRYD) 40 MG tablet tablet TAKE 1 TABLET BY MOUTH DAILY 90 tablet 1     No current facility-administered medications for this visit.     Physical Exam:   There were no vitals taken for this visit.    MENTAL STATUS EXAM   General Appearance:  Cleanly groomed and dressed  Eye Contact:  Good eye contact  Attitude:  Cooperative  Motor Activity:  Normal gait, posture  Muscle Strength:  Normal  Speech:  Normal rate, tone, volume  Language:  Spontaneous  Mood and affect:  Normal, pleasant  Hopelessness:  Denies  Loneliness: Denies  Thought Process:  Logical and goal-directed  Associations/ Thought Content:  No delusions  Hallucinations:  None  Suicidal Ideations:  Not present  Homicidal Ideation:  Not present  Sensorium:  Alert  Orientation:  Person, place, time and situation  Immediate Recall,  Recent, and Remote Memory:  Intact  Attention Span/ Concentration:  Good  Fund of Knowledge:  Appropriate for age and educational level  Intellectual Functioning:  Average range  Insight:  Good  Judgement:  Good  Reliability:  Good  Impulse Control:  Good    PHQ-9    Little interest or pleasure in doing things? Several days   Feeling down, depressed, or hopeless? Several days   Trouble falling or staying asleep, or sleeping too much? Over half   Feeling tired or having little energy? Over half   Poor appetite or overeating? Not at all   Feeling bad about yourself - or that you are a failure or have let yourself or your family down? Almost all   Trouble concentrating on things, such as reading the newspaper or watching television? Over half   Moving or speaking so slowly that other people could have noticed? Or the opposite - being so fidgety or restless that you have been moving around a lot more than usual? Not at all   Thoughts that you would be better off dead, or of hurting yourself in some way? Not at all   PHQ-9 Total Score 11   If you checked off any problems, how difficult have these problems made it for you to do your work, take care of things at home, or get along with other people? Somewhat difficult      GAD7 Documentation:  Feeling nervous, anxious or on edge 0   Not being able to stop or control worrying 1   Worrying too much about different things 1   Trouble relaxing 1   Being so restless that it is hard to sit still 0   Becoming easily annoyed or irritable 0   Feeling Afraid as if something awful might happen 1   SYLVESTER Total Score 4   How difficult have these problems made it for you? Not difficult at all        Never smoker    I advised Carlotta of the risks of tobacco use.     Result Review:    Labs:  No visits with results within 3 Month(s) from this visit.   Latest known visit with results is:   No results found for any previous visit.       Assessment & Plan   Diagnoses and all orders for this  visit:    1. Major depressive disorder, recurrent episode, moderate (Primary)    2. SYLVESTER (generalized anxiety disorder)    3. Insomnia due to other mental disorder        Continue vilazodone 40mg daily.   Continue trazodone 50-100mg nightly.   Continue buspirone 15mg twice daily.      Visit Diagnoses:    ICD-10-CM ICD-9-CM   1. Major depressive disorder, recurrent episode, moderate  F33.1 296.32   2. SYLVESTER (generalized anxiety disorder)  F41.1 300.02   3. Insomnia due to other mental disorder  F51.05 300.9    F99 327.02           TREATMENT PLAN/GOALS: Continue supportive psychotherapy efforts and medications as indicated. Treatment and medication options discussed during today's visit. Patient ackowledged and verbally consented to continue with current treatment plan and was educated on the importance of compliance with treatment and follow-up appointments.    MEDICATION ISSUES:  INSPECT reviewed as expected    Discussed medication options and treatment plan of prescribed medication as well as the risks, benefits, and side effects including potential falls, possible impaired driving and metabolic adversities among others. Patient is agreeable to call the office with any worsening of symptoms or onset of side effects. Patient is agreeable to call 911 or go to the nearest ER should he/she begin having SI/HI. No medication side effects or related complaints today.     MEDS ORDERED DURING VISIT:  No orders of the defined types were placed in this encounter.      Return in about 6 months (around 11/6/2025).         This document has been electronically signed by JONATHAN Valadez  May 6, 2025 11:02 EDT    Part of this note may be an electronic transcription/translation of spoken language to printed text using the Dragon Dictation System.    Answers for HPI/ROS submitted by the patient on 2/7/2023  Please describe your symptoms.: Depression and anxiety ongoing going through nasty divorce  Have you had these symptoms  before?: Yes  How long have you been having these symptoms?: Greater than 2 weeks  Please list any medications you are currently taking for this condition.: BusPIRone 15 mg twice a day, Vilazodone 40, [id number]  Please describe any probable cause for these symptoms. : Going through a divorce. I filed over a year ago  What is the primary reason for your visit?: Other

## 2025-05-06 ENCOUNTER — OFFICE VISIT (OUTPATIENT)
Dept: PSYCHIATRY | Facility: CLINIC | Age: 70
End: 2025-05-06
Payer: MEDICARE

## 2025-05-06 DIAGNOSIS — F33.1 MAJOR DEPRESSIVE DISORDER, RECURRENT EPISODE, MODERATE: Primary | Chronic | ICD-10-CM

## 2025-05-06 DIAGNOSIS — F51.05 INSOMNIA DUE TO OTHER MENTAL DISORDER: Chronic | ICD-10-CM

## 2025-05-06 DIAGNOSIS — F41.1 GAD (GENERALIZED ANXIETY DISORDER): Chronic | ICD-10-CM

## 2025-05-06 DIAGNOSIS — F99 INSOMNIA DUE TO OTHER MENTAL DISORDER: Chronic | ICD-10-CM

## 2025-05-06 RX ORDER — AZELASTINE HYDROCHLORIDE, FLUTICASONE PROPIONATE 137; 50 UG/1; UG/1
1 SPRAY, METERED NASAL 2 TIMES DAILY
COMMUNITY
Start: 2025-01-23

## 2025-05-06 RX ORDER — VALSARTAN 40 MG/1
40 TABLET ORAL 2 TIMES DAILY
COMMUNITY

## 2025-05-06 RX ORDER — SEMAGLUTIDE 1.34 MG/ML
1 INJECTION, SOLUTION SUBCUTANEOUS WEEKLY
COMMUNITY

## 2025-05-06 RX ORDER — HYDRALAZINE HYDROCHLORIDE 25 MG/1
25 TABLET, FILM COATED ORAL AS NEEDED
COMMUNITY
Start: 2025-02-25 | End: 2026-02-25

## 2025-05-06 RX ORDER — ALBUTEROL SULFATE AND BUDESONIDE 90; 80 UG/1; UG/1
2 AEROSOL, METERED RESPIRATORY (INHALATION) DAILY
COMMUNITY
Start: 2025-02-25

## 2025-05-07 NOTE — PROGRESS NOTES
I have reviewed the notes, assessments, and/or procedures performed by Rachelle Peterson, I concur with her/his documentation of Carlotta Cooper.

## 2025-08-11 DIAGNOSIS — F51.05 INSOMNIA DUE TO OTHER MENTAL DISORDER: Chronic | ICD-10-CM

## 2025-08-11 DIAGNOSIS — F99 INSOMNIA DUE TO OTHER MENTAL DISORDER: Chronic | ICD-10-CM

## 2025-08-12 RX ORDER — TRAZODONE HYDROCHLORIDE 50 MG/1
50-100 TABLET ORAL NIGHTLY PRN
Qty: 180 TABLET | Refills: 1 | Status: SHIPPED | OUTPATIENT
Start: 2025-08-12

## 2025-08-23 DIAGNOSIS — F41.1 GAD (GENERALIZED ANXIETY DISORDER): Chronic | ICD-10-CM

## 2025-08-23 DIAGNOSIS — F33.1 MAJOR DEPRESSIVE DISORDER, RECURRENT EPISODE, MODERATE: Chronic | ICD-10-CM

## 2025-08-23 RX ORDER — VILAZODONE HYDROCHLORIDE 40 MG/1
40 TABLET ORAL DAILY
Qty: 90 TABLET | Refills: 0 | Status: SHIPPED | OUTPATIENT
Start: 2025-08-23